# Patient Record
Sex: FEMALE | Race: BLACK OR AFRICAN AMERICAN | Employment: PART TIME | URBAN - METROPOLITAN AREA
[De-identification: names, ages, dates, MRNs, and addresses within clinical notes are randomized per-mention and may not be internally consistent; named-entity substitution may affect disease eponyms.]

---
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
## 2023-07-25 ENCOUNTER — APPOINTMENT (OUTPATIENT)
Dept: CT IMAGING | Age: 23
End: 2023-07-25

## 2023-07-25 ENCOUNTER — HOSPITAL ENCOUNTER (EMERGENCY)
Age: 23
Discharge: HOME | End: 2023-07-25
Attending: EMERGENCY MEDICINE

## 2023-07-25 VITALS
TEMPERATURE: 98.3 F | BODY MASS INDEX: 30.39 KG/M2 | OXYGEN SATURATION: 100 % | DIASTOLIC BLOOD PRESSURE: 77 MMHG | HEIGHT: 64 IN | HEART RATE: 75 BPM | SYSTOLIC BLOOD PRESSURE: 107 MMHG | RESPIRATION RATE: 18 BRPM

## 2023-07-25 DIAGNOSIS — S09.90XA INJURY OF HEAD, INITIAL ENCOUNTER: Primary | ICD-10-CM

## 2023-07-25 PROCEDURE — 72125 CT NECK SPINE W/O DYE: CPT

## 2023-07-25 PROCEDURE — 70450 CT HEAD/BRAIN W/O DYE: CPT

## 2023-07-25 PROCEDURE — 99284 EMERGENCY DEPT VISIT MOD MDM: CPT

## 2023-07-25 RX ORDER — TOPIRAMATE 50 MG/1
50 TABLET, FILM COATED ORAL 2 TIMES DAILY
COMMUNITY
Start: 2023-03-21

## 2023-07-25 RX ORDER — ARIPIPRAZOLE 30 MG/1
30 TABLET ORAL DAILY
COMMUNITY
Start: 2023-03-21

## 2023-07-25 RX ORDER — LORAZEPAM 0.5 MG/1
1 TABLET ORAL 3 TIMES DAILY PRN
COMMUNITY
Start: 2023-03-21

## 2023-07-25 RX ORDER — LEVONORGESTREL 52 MG/1
1 INTRAUTERINE DEVICE INTRAUTERINE ONCE
COMMUNITY

## 2023-07-25 RX ORDER — RISPERIDONE 2 MG/1
2 TABLET ORAL 2 TIMES DAILY
COMMUNITY
Start: 2023-03-21

## 2023-07-25 RX ORDER — BUPROPION HYDROCHLORIDE 300 MG/1
300 TABLET ORAL DAILY
COMMUNITY
Start: 2023-04-18

## 2023-07-25 RX ORDER — DEXTROAMPHETAMINE SULFATE 10 MG/1
10 TABLET ORAL 3 TIMES DAILY
COMMUNITY
Start: 2019-07-15

## 2023-07-25 RX ORDER — ATOMOXETINE 40 MG/1
40 CAPSULE ORAL DAILY
COMMUNITY
Start: 2023-03-21

## 2023-07-25 RX ORDER — PRAZOSIN HYDROCHLORIDE 5 MG/1
5 CAPSULE ORAL NIGHTLY
COMMUNITY
Start: 2022-08-17

## 2023-07-25 RX ORDER — CLONIDINE HYDROCHLORIDE 0.3 MG/1
2 TABLET ORAL NIGHTLY
COMMUNITY
Start: 2023-03-21

## 2023-07-25 ASSESSMENT — PAIN - FUNCTIONAL ASSESSMENT: PAIN_FUNCTIONAL_ASSESSMENT: 0-10

## 2023-07-25 ASSESSMENT — PAIN DESCRIPTION - DESCRIPTORS: DESCRIPTORS: ACHING

## 2023-07-25 ASSESSMENT — PAIN DESCRIPTION - LOCATION: LOCATION: HEAD

## 2023-07-25 ASSESSMENT — PAIN SCALES - GENERAL: PAINLEVEL_OUTOF10: 8

## 2023-07-25 NOTE — ED PROVIDER NOTES
I PERSONALLY SAW THE PATIENT AND PERFORMED A SUBSTANTIVE PORTION OF THE VISIT INCLUDING ALL ASPECTS OF THE MEDICAL DECISION MAKING PROCESS. [address] [facility] [address] [address]      Pt Name: Mayra Gan  MRN: [id number]  [address] [facility] 2000  Date of evaluation: 7/25/2023  Provider: Chandler Sánchez MD    CHIEF COMPLAINT       Chief Complaint   Patient presents with    Headache     Pt states she fell on Saturday afternoon and continues to have a headache. She tripped over her cat and fell down about 5 stairs. Unsure of LOC. States Saturday evening she vomited. C/o neck pain and states she is seeing floaters. HISTORY OF PRESENT ILLNESS    Mayra Gan is a 24 y.o. female who presents to the emergency department with head injury. Patient presents with head injury that happened Saturday. She fell hit her head. Has had head pain and nausea vomiting since. No chest pain or shortness of breath. No other associated symptoms. No weakness, saddle numbness, loss of bowel or bladder function. No other associated symptoms. Nursing Notes were reviewed. Including nursing noted for FM, Surgical History, Past Medical History, Social History, vitals, and allergies; agree with all. REVIEW OF SYSTEMS       Review of Systems    Except as noted above the remainder of the review of systems was reviewed and negative. PAST MEDICAL HISTORY     Past Medical History:   Diagnosis Date    Chronic kidney disease        SURGICAL HISTORY     History reviewed. No pertinent surgical history.     CURRENT MEDICATIONS       Previous Medications    ARIPIPRAZOLE (ABILIFY) 30 MG TABLET    Take 1 tablet by mouth daily    ATOMOXETINE (STRATTERA) 40 MG CAPSULE    Take 1 capsule by mouth daily    BUPROPION (WELLBUTRIN XL) 300 MG EXTENDED RELEASE TABLET    Take 1 tablet by mouth daily    CLONIDINE (CATAPRES) 0.3 MG TABLET    Take 2 tablets by mouth at bedtime    DEXTROAMPHETAMINE

## 2023-07-30 PROCEDURE — 96374 THER/PROPH/DIAG INJ IV PUSH: CPT

## 2023-07-30 PROCEDURE — 99285 EMERGENCY DEPT VISIT HI MDM: CPT

## 2023-07-30 PROCEDURE — 96375 TX/PRO/DX INJ NEW DRUG ADDON: CPT

## 2023-07-31 ENCOUNTER — APPOINTMENT (OUTPATIENT)
Dept: CT IMAGING | Age: 23
DRG: 103 | End: 2023-07-31
Payer: COMMERCIAL

## 2023-07-31 ENCOUNTER — HOSPITAL ENCOUNTER (INPATIENT)
Age: 23
LOS: 1 days | Discharge: HOME | DRG: 103 | End: 2023-08-01
Attending: EMERGENCY MEDICINE | Admitting: INTERNAL MEDICINE
Payer: COMMERCIAL

## 2023-07-31 DIAGNOSIS — Z71.89 GOALS OF CARE, COUNSELING/DISCUSSION: ICD-10-CM

## 2023-07-31 DIAGNOSIS — R51.9 ACUTE INTRACTABLE HEADACHE, UNSPECIFIED HEADACHE TYPE: Primary | ICD-10-CM

## 2023-07-31 LAB
ALBUMIN SERPL-MCNC: 4.3 G/DL (ref 3.4–5)
ALBUMIN/GLOB SERPL: 1.4 {RATIO} (ref 1.1–2.2)
ALP SERPL-CCNC: 150 U/L (ref 40–129)
ALT SERPL-CCNC: 7 U/L (ref 10–40)
ANION GAP SERPL CALCULATED.3IONS-SCNC: 13 MMOL/L (ref 3–16)
ANION GAP SERPL CALCULATED.3IONS-SCNC: 9 MMOL/L (ref 3–16)
AST SERPL-CCNC: 16 U/L (ref 15–37)
BASOPHILS # BLD: 0 K/UL (ref 0–0.2)
BASOPHILS # BLD: 0.1 K/UL (ref 0–0.2)
BASOPHILS NFR BLD: 0.3 %
BASOPHILS NFR BLD: 0.7 %
BILIRUB SERPL-MCNC: <0.2 MG/DL (ref 0–1)
BILIRUB UR QL STRIP.AUTO: NEGATIVE
BUN SERPL-MCNC: 10 MG/DL (ref 7–20)
BUN SERPL-MCNC: 10 MG/DL (ref 7–20)
CALCIUM SERPL-MCNC: 8.5 MG/DL (ref 8.3–10.6)
CALCIUM SERPL-MCNC: 9 MG/DL (ref 8.3–10.6)
CHLORIDE SERPL-SCNC: 108 MMOL/L (ref 99–110)
CHLORIDE SERPL-SCNC: 111 MMOL/L (ref 99–110)
CLARITY UR: CLEAR
CO2 SERPL-SCNC: 19 MMOL/L (ref 21–32)
CO2 SERPL-SCNC: 21 MMOL/L (ref 21–32)
COLOR UR: YELLOW
CREAT SERPL-MCNC: 1.5 MG/DL (ref 0.6–1.1)
CREAT SERPL-MCNC: 1.5 MG/DL (ref 0.6–1.1)
DEPRECATED RDW RBC AUTO: 12.7 % (ref 12.4–15.4)
DEPRECATED RDW RBC AUTO: 12.9 % (ref 12.4–15.4)
EOSINOPHIL # BLD: 0.1 K/UL (ref 0–0.6)
EOSINOPHIL # BLD: 0.1 K/UL (ref 0–0.6)
EOSINOPHIL NFR BLD: 0.5 %
EOSINOPHIL NFR BLD: 0.7 %
GFR SERPLBLD CREATININE-BSD FMLA CKD-EPI: 49 ML/MIN/{1.73_M2}
GFR SERPLBLD CREATININE-BSD FMLA CKD-EPI: 49 ML/MIN/{1.73_M2}
GLUCOSE SERPL-MCNC: 128 MG/DL (ref 70–99)
GLUCOSE SERPL-MCNC: 97 MG/DL (ref 70–99)
GLUCOSE UR STRIP.AUTO-MCNC: NEGATIVE MG/DL
HCG SERPL QL: NEGATIVE
HCT VFR BLD AUTO: 38.1 % (ref 36–48)
HCT VFR BLD AUTO: 41.2 % (ref 36–48)
HGB BLD-MCNC: 12.5 G/DL (ref 12–16)
HGB BLD-MCNC: 13.5 G/DL (ref 12–16)
HGB UR QL STRIP.AUTO: NEGATIVE
INR PPP: 1.13 (ref 0.84–1.16)
KETONES UR STRIP.AUTO-MCNC: NEGATIVE MG/DL
LACTATE BLDV-SCNC: 1 MMOL/L (ref 0.4–1.9)
LEUKOCYTE ESTERASE UR QL STRIP.AUTO: NEGATIVE
LYMPHOCYTES # BLD: 3 K/UL (ref 1–5.1)
LYMPHOCYTES # BLD: 3.3 K/UL (ref 1–5.1)
LYMPHOCYTES NFR BLD: 18.9 %
LYMPHOCYTES NFR BLD: 22.7 %
MAGNESIUM SERPL-MCNC: 1.8 MG/DL (ref 1.8–2.4)
MCH RBC QN AUTO: 28.5 PG (ref 26–34)
MCH RBC QN AUTO: 28.6 PG (ref 26–34)
MCHC RBC AUTO-ENTMCNC: 32.7 G/DL (ref 31–36)
MCHC RBC AUTO-ENTMCNC: 32.7 G/DL (ref 31–36)
MCV RBC AUTO: 87.2 FL (ref 80–100)
MCV RBC AUTO: 87.4 FL (ref 80–100)
MONOCYTES # BLD: 0.6 K/UL (ref 0–1.3)
MONOCYTES # BLD: 0.9 K/UL (ref 0–1.3)
MONOCYTES NFR BLD: 4.4 %
MONOCYTES NFR BLD: 5.6 %
NEUTROPHILS # BLD: 10.4 K/UL (ref 1.7–7.7)
NEUTROPHILS # BLD: 11.6 K/UL (ref 1.7–7.7)
NEUTROPHILS NFR BLD: 72.1 %
NEUTROPHILS NFR BLD: 74.1 %
NITRITE UR QL STRIP.AUTO: NEGATIVE
PH UR STRIP.AUTO: 6 [PH] (ref 5–8)
PLATELET # BLD AUTO: 321 K/UL (ref 135–450)
PLATELET # BLD AUTO: 334 K/UL (ref 135–450)
PMV BLD AUTO: 7.4 FL (ref 5–10.5)
PMV BLD AUTO: 7.8 FL (ref 5–10.5)
POTASSIUM SERPL-SCNC: 3.3 MMOL/L (ref 3.5–5.1)
POTASSIUM SERPL-SCNC: 3.6 MMOL/L (ref 3.5–5.1)
PROT SERPL-MCNC: 7.4 G/DL (ref 6.4–8.2)
PROT UR STRIP.AUTO-MCNC: NEGATIVE MG/DL
PROTHROMBIN TIME: 14.6 SEC (ref 11.5–14.8)
RBC # BLD AUTO: 4.35 M/UL (ref 4–5.2)
RBC # BLD AUTO: 4.72 M/UL (ref 4–5.2)
SODIUM SERPL-SCNC: 140 MMOL/L (ref 136–145)
SODIUM SERPL-SCNC: 141 MMOL/L (ref 136–145)
SP GR UR STRIP.AUTO: 1.02 (ref 1–1.03)
UA COMPLETE W REFLEX CULTURE PNL UR: NORMAL
UA DIPSTICK W REFLEX MICRO PNL UR: NORMAL
URN SPEC COLLECT METH UR: NORMAL
UROBILINOGEN UR STRIP-ACNC: 1 E.U./DL
WBC # BLD AUTO: 14.3 K/UL (ref 4–11)
WBC # BLD AUTO: 15.6 K/UL (ref 4–11)

## 2023-07-31 PROCEDURE — 83735 ASSAY OF MAGNESIUM: CPT

## 2023-07-31 PROCEDURE — 2500000003 HC RX 250 WO HCPCS: Performed by: STUDENT IN AN ORGANIZED HEALTH CARE EDUCATION/TRAINING PROGRAM

## 2023-07-31 PROCEDURE — 6370000000 HC RX 637 (ALT 250 FOR IP): Performed by: HOSPITALIST

## 2023-07-31 PROCEDURE — 6370000000 HC RX 637 (ALT 250 FOR IP): Performed by: INTERNAL MEDICINE

## 2023-07-31 PROCEDURE — 83605 ASSAY OF LACTIC ACID: CPT

## 2023-07-31 PROCEDURE — 2580000003 HC RX 258: Performed by: STUDENT IN AN ORGANIZED HEALTH CARE EDUCATION/TRAINING PROGRAM

## 2023-07-31 PROCEDURE — 6360000002 HC RX W HCPCS

## 2023-07-31 PROCEDURE — 94760 N-INVAS EAR/PLS OXIMETRY 1: CPT

## 2023-07-31 PROCEDURE — 1200000000 HC SEMI PRIVATE

## 2023-07-31 PROCEDURE — 85025 COMPLETE CBC W/AUTO DIFF WBC: CPT

## 2023-07-31 PROCEDURE — 6370000000 HC RX 637 (ALT 250 FOR IP): Performed by: NURSE PRACTITIONER

## 2023-07-31 PROCEDURE — 2580000003 HC RX 258: Performed by: INTERNAL MEDICINE

## 2023-07-31 PROCEDURE — 2580000003 HC RX 258

## 2023-07-31 PROCEDURE — 87040 BLOOD CULTURE FOR BACTERIA: CPT

## 2023-07-31 PROCEDURE — 36415 COLL VENOUS BLD VENIPUNCTURE: CPT

## 2023-07-31 PROCEDURE — 70450 CT HEAD/BRAIN W/O DYE: CPT

## 2023-07-31 PROCEDURE — 80053 COMPREHEN METABOLIC PANEL: CPT

## 2023-07-31 PROCEDURE — 84703 CHORIONIC GONADOTROPIN ASSAY: CPT

## 2023-07-31 PROCEDURE — 85610 PROTHROMBIN TIME: CPT

## 2023-07-31 PROCEDURE — 81003 URINALYSIS AUTO W/O SCOPE: CPT

## 2023-07-31 RX ORDER — DEXTROAMPHETAMINE SULFATE 10 MG/1
10 TABLET ORAL 3 TIMES DAILY
Status: DISCONTINUED | OUTPATIENT
Start: 2023-07-31 | End: 2023-08-01 | Stop reason: HOSPADM

## 2023-07-31 RX ORDER — SODIUM CHLORIDE, SODIUM LACTATE, POTASSIUM CHLORIDE, AND CALCIUM CHLORIDE .6; .31; .03; .02 G/100ML; G/100ML; G/100ML; G/100ML
1000 INJECTION, SOLUTION INTRAVENOUS ONCE
Status: COMPLETED | OUTPATIENT
Start: 2023-07-31 | End: 2023-07-31

## 2023-07-31 RX ORDER — KETOROLAC TROMETHAMINE 15 MG/ML
15 INJECTION, SOLUTION INTRAMUSCULAR; INTRAVENOUS ONCE
Status: DISCONTINUED | OUTPATIENT
Start: 2023-07-31 | End: 2023-07-31

## 2023-07-31 RX ORDER — KETOROLAC TROMETHAMINE 30 MG/ML
15 INJECTION, SOLUTION INTRAMUSCULAR; INTRAVENOUS ONCE
Status: COMPLETED | OUTPATIENT
Start: 2023-07-31 | End: 2023-07-31

## 2023-07-31 RX ORDER — BUTALBITAL, ACETAMINOPHEN AND CAFFEINE 50; 325; 40 MG/1; MG/1; MG/1
1 TABLET ORAL EVERY 4 HOURS PRN
Status: DISCONTINUED | OUTPATIENT
Start: 2023-07-31 | End: 2023-08-01 | Stop reason: HOSPADM

## 2023-07-31 RX ORDER — CYCLOBENZAPRINE HCL 10 MG
10 TABLET ORAL 3 TIMES DAILY PRN
Status: DISCONTINUED | OUTPATIENT
Start: 2023-07-31 | End: 2023-08-01 | Stop reason: HOSPADM

## 2023-07-31 RX ORDER — POLYETHYLENE GLYCOL 3350 17 G/17G
17 POWDER, FOR SOLUTION ORAL DAILY PRN
Status: DISCONTINUED | OUTPATIENT
Start: 2023-07-31 | End: 2023-08-01 | Stop reason: HOSPADM

## 2023-07-31 RX ORDER — MORPHINE SULFATE 2 MG/ML
2 INJECTION, SOLUTION INTRAMUSCULAR; INTRAVENOUS ONCE
Status: COMPLETED | OUTPATIENT
Start: 2023-07-31 | End: 2023-07-31

## 2023-07-31 RX ORDER — RISPERIDONE 1 MG/1
2 TABLET ORAL 2 TIMES DAILY
Status: DISCONTINUED | OUTPATIENT
Start: 2023-07-31 | End: 2023-08-01 | Stop reason: HOSPADM

## 2023-07-31 RX ORDER — LORAZEPAM 0.5 MG/1
0.5 TABLET ORAL 3 TIMES DAILY PRN
Status: DISCONTINUED | OUTPATIENT
Start: 2023-07-31 | End: 2023-08-01 | Stop reason: HOSPADM

## 2023-07-31 RX ORDER — SODIUM CHLORIDE 0.9 % (FLUSH) 0.9 %
5-40 SYRINGE (ML) INJECTION PRN
Status: DISCONTINUED | OUTPATIENT
Start: 2023-07-31 | End: 2023-08-01 | Stop reason: HOSPADM

## 2023-07-31 RX ORDER — ONDANSETRON 4 MG/1
4 TABLET, ORALLY DISINTEGRATING ORAL EVERY 8 HOURS PRN
Status: DISCONTINUED | OUTPATIENT
Start: 2023-07-31 | End: 2023-08-01 | Stop reason: HOSPADM

## 2023-07-31 RX ORDER — ACETAMINOPHEN 650 MG/1
650 SUPPOSITORY RECTAL EVERY 6 HOURS PRN
Status: DISCONTINUED | OUTPATIENT
Start: 2023-07-31 | End: 2023-08-01 | Stop reason: HOSPADM

## 2023-07-31 RX ORDER — SODIUM CHLORIDE 0.9 % (FLUSH) 0.9 %
5-40 SYRINGE (ML) INJECTION EVERY 12 HOURS SCHEDULED
Status: DISCONTINUED | OUTPATIENT
Start: 2023-07-31 | End: 2023-08-01 | Stop reason: HOSPADM

## 2023-07-31 RX ORDER — ATOMOXETINE 40 MG/1
40 CAPSULE ORAL DAILY
Status: DISCONTINUED | OUTPATIENT
Start: 2023-07-31 | End: 2023-08-01 | Stop reason: HOSPADM

## 2023-07-31 RX ORDER — SODIUM CHLORIDE 9 MG/ML
INJECTION, SOLUTION INTRAVENOUS CONTINUOUS
Status: ACTIVE | OUTPATIENT
Start: 2023-07-31 | End: 2023-07-31

## 2023-07-31 RX ORDER — BUPROPION HYDROCHLORIDE 150 MG/1
300 TABLET ORAL DAILY
Status: DISCONTINUED | OUTPATIENT
Start: 2023-07-31 | End: 2023-08-01 | Stop reason: HOSPADM

## 2023-07-31 RX ORDER — SODIUM CHLORIDE 9 MG/ML
INJECTION, SOLUTION INTRAVENOUS PRN
Status: DISCONTINUED | OUTPATIENT
Start: 2023-07-31 | End: 2023-08-01 | Stop reason: HOSPADM

## 2023-07-31 RX ORDER — CLONIDINE HYDROCHLORIDE 0.1 MG/1
0.6 TABLET ORAL NIGHTLY
Status: DISCONTINUED | OUTPATIENT
Start: 2023-07-31 | End: 2023-08-01 | Stop reason: HOSPADM

## 2023-07-31 RX ORDER — ACETAMINOPHEN 325 MG/1
650 TABLET ORAL EVERY 6 HOURS PRN
Status: DISCONTINUED | OUTPATIENT
Start: 2023-07-31 | End: 2023-08-01 | Stop reason: HOSPADM

## 2023-07-31 RX ORDER — DIPHENHYDRAMINE HYDROCHLORIDE 50 MG/ML
12.5 INJECTION INTRAMUSCULAR; INTRAVENOUS ONCE
Status: COMPLETED | OUTPATIENT
Start: 2023-07-31 | End: 2023-07-31

## 2023-07-31 RX ORDER — ARIPIPRAZOLE 15 MG/1
30 TABLET ORAL DAILY
Status: DISCONTINUED | OUTPATIENT
Start: 2023-07-31 | End: 2023-08-01 | Stop reason: HOSPADM

## 2023-07-31 RX ORDER — PRAZOSIN HYDROCHLORIDE 5 MG/1
5 CAPSULE ORAL NIGHTLY
Status: DISCONTINUED | OUTPATIENT
Start: 2023-07-31 | End: 2023-08-01 | Stop reason: HOSPADM

## 2023-07-31 RX ORDER — ONDANSETRON 2 MG/ML
4 INJECTION INTRAMUSCULAR; INTRAVENOUS EVERY 6 HOURS PRN
Status: DISCONTINUED | OUTPATIENT
Start: 2023-07-31 | End: 2023-08-01 | Stop reason: HOSPADM

## 2023-07-31 RX ORDER — TOPIRAMATE 25 MG/1
50 TABLET ORAL 2 TIMES DAILY
Status: DISCONTINUED | OUTPATIENT
Start: 2023-07-31 | End: 2023-08-01 | Stop reason: HOSPADM

## 2023-07-31 RX ORDER — PROCHLORPERAZINE EDISYLATE 5 MG/ML
10 INJECTION INTRAMUSCULAR; INTRAVENOUS ONCE
Status: COMPLETED | OUTPATIENT
Start: 2023-07-31 | End: 2023-07-31

## 2023-07-31 RX ADMIN — VALPROATE SODIUM 500 MG: 100 INJECTION, SOLUTION INTRAVENOUS at 18:39

## 2023-07-31 RX ADMIN — BUTALBITAL, ACETAMINOPHEN AND CAFFEINE 1 TABLET: 325; 50; 40 TABLET ORAL at 20:31

## 2023-07-31 RX ADMIN — DIPHENHYDRAMINE HYDROCHLORIDE 12.5 MG: 50 INJECTION, SOLUTION INTRAMUSCULAR; INTRAVENOUS at 01:30

## 2023-07-31 RX ADMIN — SODIUM CHLORIDE: 9 INJECTION, SOLUTION INTRAVENOUS at 05:31

## 2023-07-31 RX ADMIN — BUTALBITAL, ACETAMINOPHEN AND CAFFEINE 1 TABLET: 325; 50; 40 TABLET ORAL at 15:14

## 2023-07-31 RX ADMIN — SODIUM CHLORIDE, POTASSIUM CHLORIDE, SODIUM LACTATE AND CALCIUM CHLORIDE 1000 ML: 600; 310; 30; 20 INJECTION, SOLUTION INTRAVENOUS at 01:38

## 2023-07-31 RX ADMIN — BUPROPION HYDROCHLORIDE 300 MG: 150 TABLET, EXTENDED RELEASE ORAL at 08:43

## 2023-07-31 RX ADMIN — ARIPIPRAZOLE 30 MG: 15 TABLET ORAL at 09:26

## 2023-07-31 RX ADMIN — LORAZEPAM 0.5 MG: 0.5 TABLET ORAL at 05:40

## 2023-07-31 RX ADMIN — MORPHINE SULFATE 2 MG: 2 INJECTION, SOLUTION INTRAMUSCULAR; INTRAVENOUS at 03:02

## 2023-07-31 RX ADMIN — SODIUM CHLORIDE, PRESERVATIVE FREE 10 ML: 5 INJECTION INTRAVENOUS at 23:39

## 2023-07-31 RX ADMIN — RISPERIDONE 2 MG: 1 TABLET ORAL at 08:43

## 2023-07-31 RX ADMIN — TOPIRAMATE 50 MG: 25 TABLET, FILM COATED ORAL at 08:43

## 2023-07-31 RX ADMIN — KETOROLAC TROMETHAMINE 15 MG: 60 INJECTION, SOLUTION INTRAMUSCULAR at 02:29

## 2023-07-31 RX ADMIN — CYCLOBENZAPRINE 10 MG: 10 TABLET, FILM COATED ORAL at 13:15

## 2023-07-31 RX ADMIN — ATOMOXETINE 40 MG: 40 CAPSULE ORAL at 09:26

## 2023-07-31 RX ADMIN — PROCHLORPERAZINE EDISYLATE 10 MG: 5 INJECTION INTRAMUSCULAR; INTRAVENOUS at 01:35

## 2023-07-31 ASSESSMENT — PAIN SCALES - GENERAL
PAINLEVEL_OUTOF10: 7
PAINLEVEL_OUTOF10: 8
PAINLEVEL_OUTOF10: 7
PAINLEVEL_OUTOF10: 7
PAINLEVEL_OUTOF10: 6

## 2023-07-31 ASSESSMENT — PAIN DESCRIPTION - FREQUENCY: FREQUENCY: CONTINUOUS

## 2023-07-31 ASSESSMENT — ENCOUNTER SYMPTOMS
CONSTIPATION: 0
VOMITING: 0
NAUSEA: 0
SORE THROAT: 0
RHINORRHEA: 0
COUGH: 0
DIARRHEA: 0
SHORTNESS OF BREATH: 0
EYE PAIN: 0
BACK PAIN: 0
PHOTOPHOBIA: 1
ABDOMINAL PAIN: 0

## 2023-07-31 ASSESSMENT — PAIN DESCRIPTION - ORIENTATION: ORIENTATION: RIGHT;LEFT

## 2023-07-31 ASSESSMENT — PAIN DESCRIPTION - DESCRIPTORS: DESCRIPTORS: THROBBING

## 2023-07-31 ASSESSMENT — PAIN DESCRIPTION - LOCATION
LOCATION: HEAD
LOCATION: HEAD

## 2023-07-31 ASSESSMENT — PAIN DESCRIPTION - PAIN TYPE: TYPE: ACUTE PAIN

## 2023-07-31 ASSESSMENT — PAIN DESCRIPTION - ONSET: ONSET: ON-GOING

## 2023-07-31 ASSESSMENT — PAIN - FUNCTIONAL ASSESSMENT: PAIN_FUNCTIONAL_ASSESSMENT: PREVENTS OR INTERFERES SOME ACTIVE ACTIVITIES AND ADLS

## 2023-07-31 NOTE — ED TRIAGE NOTES
Diane Milligan is a 24 y.o. female brought herself to the ER for eval of headache x 2 weeks that is a 8/10. The patient states that her headache is making it hard to concentrate and that she gets confused. The patient feels that she has had a fever the last two days because she had chills but never took her temp. The patient states that her leg went numb while sitting at work today at 1700 for 30 mins but is now normal. The patient is alert and oriented with an open and patent airway.

## 2023-07-31 NOTE — PLAN OF CARE
Problem: Discharge Planning  Goal: Discharge to home or other facility with appropriate resources  7/31/2023 1054 by Laureen Ravi RN  Outcome: Progressing  7/31/2023 0626 by Jannette Guzman RN  Outcome: Progressing     Problem: Safety - Adult  Goal: Free from fall injury  7/31/2023 1054 by Laureen Ravi RN  Outcome: Progressing  7/31/2023 0626 by Jannette Guzman RN  Outcome: Progressing     Problem: ABCDS Injury Assessment  Goal: Absence of physical injury  7/31/2023 1054 by Laureen Ravi RN  Outcome: Progressing  7/31/2023 0626 by Jannette Guzman RN  Outcome: Progressing     Problem: Neurosensory - Adult  Goal: Achieves stable or improved neurological status  7/31/2023 1054 by Laureen Ravi RN  Outcome: Progressing  7/31/2023 0626 by Jannette Guzman RN  Outcome: Progressing  Goal: Achieves maximal functionality and self care  7/31/2023 1054 by Laureen Ravi RN  Outcome: Progressing  7/31/2023 0626 by Jannette Guzman RN  Outcome: Progressing     Problem: Musculoskeletal - Adult  Goal: Return mobility to safest level of function  7/31/2023 1054 by Laureen Ravi RN  Outcome: Progressing  7/31/2023 0626 by Jannette Guzman RN  Outcome: Progressing  Goal: Maintain proper alignment of affected body part  7/31/2023 1054 by Laureen Ravi RN  Outcome: Progressing  7/31/2023 0626 by Jannette Guzman RN  Outcome: Progressing  Goal: Return ADL status to a safe level of function  7/31/2023 1054 by Laureen Ravi RN  Outcome: Progressing  7/31/2023 0626 by Jannette Guzman RN  Outcome: Progressing

## 2023-07-31 NOTE — PROGRESS NOTES
Two attempts to call patient's mother at patient's request.  Got voice mail. Left message with temporary number to call back. No call back received.     [id number]  [facility] - [place] Shaylee, AGACNP-BC  [address] [facility]

## 2023-07-31 NOTE — H&P
Hospital Medicine  History and Physical    Patient:  Zoraida Garcia  MRN: [id number]    CHIEF COMPLAINT:    Patient presents with    Fever       X 2 days, patient felt like she had a fever with chills, but didn't take her temp    Headache       X 2 weeks, 9/10 pt states its making it hard to concentrate and she feels confused     PCP: No primary care provider on file. HISTORY OF PRESENT ILLNESS:   The patient is a 24 y.o. female who presents to the emergency department with fever and headache. Patient is a 2-day history of fever and headache. 10 out of 10 sharp achy headache. Difficulty with concentration and confusion. Positive for neck pain. No other associated symptoms. Denied weakness, chest pain, sob, chills, dizziness, vision changes, abdominal pain, diarrhea, or dysuria. PAST MEDICAL HISTORY:      Diagnosis Date    Chronic kidney disease        PAST SURGICAL HISTORY:  History reviewed. No pertinent surgical history. FAMILY  HISTORY:  History reviewed. No pertinent family history. SOCIAL HISTORY:  TOBACCO:   reports that she has never smoked. She has never used smokeless tobacco.  ETOH:   reports no history of alcohol use. MEDICATIONS PRIOR TO ADMISSION:  Prior to Admission medications    Medication Sig Start Date End Date Taking? Authorizing Provider   topiramate (TOPAMAX) 50 MG tablet Take 1 tablet by mouth 2 times daily 3/21/23   Historical Provider, MD   risperiDONE (RISPERDAL) 2 MG tablet Take 1 tablet by mouth 2 times daily 3/21/23   Historical Provider, MD   prazosin (MINIPRESS) 5 MG capsule Take 1 capsule by mouth nightly 8/17/22   Historical Provider, MD   LORazepam (ATIVAN) 0.5 MG tablet Take 1 tablet by mouth 3 times daily as needed for Anxiety.  Max Daily Amount: 1.5 mg 3/21/23   Historical Provider, MD   levonorgestrel (MIRENA, 52 MG,) IUD 52 mg 1 Intra Uterine Device by IntraUTERine route once    Historical Provider, MD   dextroamphetamine (DEXTROSTAT) 10 MG tablet Take 1

## 2023-07-31 NOTE — PROGRESS NOTES
Pharmacy Medication Reconciliation Note     List of medications patient is currently taking is complete. Source of information:   1. Conversation with pt at bedside   2. MD office note     Allergies   Allergen Reactions    Latex Rash    Food      Milk    Oseltamivir        Notes regarding home medications:   1. Dextroamphetamine 10 mg is taken BID  2. Risperdal regimen is 2 mg AM, 1 mg midday ( if needed), 2 mg HS  3.   Lorazepam is taken 1 mg at hs      Brenda Andi[address]   7/31/2023  10:12 AM

## 2023-07-31 NOTE — PLAN OF CARE
Problem: Discharge Planning  Goal: Discharge to home or other facility with appropriate resources  Outcome: Progressing     Problem: Safety - Adult  Goal: Free from fall injury  Outcome: Progressing     Problem: ABCDS Injury Assessment  Goal: Absence of physical injury  Outcome: Progressing     Problem: Neurosensory - Adult  Goal: Achieves stable or improved neurological status  Outcome: Progressing  Goal: Achieves maximal functionality and self care  Outcome: Progressing     Problem: Musculoskeletal - Adult  Goal: Return mobility to safest level of function  Outcome: Progressing  Goal: Maintain proper alignment of affected body part  Outcome: Progressing  Goal: Return ADL status to a safe level of function  Outcome: Progressing

## 2023-07-31 NOTE — PROGRESS NOTES
4 Eyes Skin Assessment     NAME:  Jessenia Sharpe  YOB: 2000  MEDICAL RECORD NUMBER:  [id number]    The patient is being assessed for  Admission    I agree that at least one RN has performed a thorough Head to Toe Skin Assessment on the patient. ALL assessment sites listed below have been assessed. Areas assessed by both nurses:    Head, Face, Ears, Shoulders, Back, Chest, Arms, Elbows, Hands, Sacrum. Buttock, Coccyx, Ischium, Legs. Feet and Heels, and Under Medical Devices         Does the Patient have a Wound?  No noted wound(s)       [place] Prevention initiated by RN: No  Wound Care Orders initiated by RN: No    Pressure Injury (Stage 3,4, Unstageable, DTI, NWPT, and Complex wounds) if present, place Wound referral order by RN under : No    New Ostomies, if present place, Ostomy referral order under : No     Nurse 1 eSignature: Electronically signed by Mart Mackey RN on 7/31/23 at 6:27 AM EDT    **SHARE this note so that the co-signing nurse can place an eSignature**    Nurse 2 eSignature: Electronically signed by River Horowitz RN on 7/31/23 at 6:28 AM EDT

## 2023-07-31 NOTE — ED PROVIDER NOTES
I PERSONALLY SAW THE PATIENT AND PERFORMED A SUBSTANTIVE PORTION OF THE VISIT INCLUDING ALL ASPECTS OF THE MEDICAL DECISION MAKING PROCESS. [facility]      Pt Name: Elham Franco  MRN: [id number]  [address] [facility] 2000  Date of evaluation: 7/30/2023  Provider: Ruben Vela MD    [address]       Chief Complaint   Patient presents with    Fever     X 2 days, patient felt like she had a fever with chills, but didn't take her temp    Headache     X 2 weeks, 9/10 pt states its making it hard to concentrate and she feels confused       HISTORY OF PRESENT ILLNESS    Elham Franco is a 24 y.o. female who presents to the emergency department with fever and headache. Patient is a 2-day history of fever and headache. 10 out of 10 sharp achy headache. Difficulty with concentration and confusion. Positive for neck pain. No other associated symptoms. No abdominal pain. Nursing Notes were reviewed. Including nursing noted for FM, Surgical History, Past Medical History, Social History, vitals, and allergies; agree with all. REVIEW OF SYSTEMS       Review of Systems    Except as noted above the remainder of the review of systems was reviewed and negative. PAST MEDICAL HISTORY     Past Medical History:   Diagnosis Date    Chronic kidney disease        SURGICAL HISTORY     History reviewed. No pertinent surgical history. CURRENT MEDICATIONS       Current Discharge Medication List        CONTINUE these medications which have NOT CHANGED    Details   topiramate (TOPAMAX) 50 MG tablet Take 1 tablet by mouth 2 times daily      risperiDONE (RISPERDAL) 2 MG tablet Take 1 tablet by mouth 2 times daily      prazosin (MINIPRESS) 5 MG capsule Take 1 capsule by mouth nightly      LORazepam (ATIVAN) 0.5 MG tablet Take 1 tablet by mouth 3 times daily as needed for Anxiety.  Max Daily Amount: 1.5 mg      levonorgestrel (MIRENA, 52 MG,) IUD 52 mg 1 Intra Uterine Device by
considered but not done, Admit vs D/C, Shared Decision Making, Pt Expectation of Test or Tx.): above       I am the Primary Clinician of Record. FINAL IMPRESSION      1. Acute intractable headache, unspecified headache type    2. Goals of care, counseling/discussion          DISPOSITION/PLAN     DISPOSITION Admitted 07/31/2023 03:49:51 AM      PATIENT REFERRED TO:  No follow-up provider specified.     DISCHARGE MEDICATIONS:  Current Discharge Medication List          DISCONTINUED MEDICATIONS:  Current Discharge Medication List        STOP taking these medications       prazosin (MINIPRESS) 5 MG capsule Comments:   Reason for Stopping:         methylPREDNISolone (MEDROL, JACKY,) 4 MG tablet Comments:   Reason for Stopping:                      (Please note that portions of this note were completed with a voice recognition program.  Efforts were made to edit the dictations but occasionally words are mis-transcribed.)    TITUS Joel CNP (electronically signed)       TITUS Joel CNP  07/31/23 [id number]

## 2023-07-31 NOTE — PROGRESS NOTES
Pt has slight delay in response and slight expressive aphasia. Neuro check showed left side weakness compared to right. No facial droop. Pt has nuchal ridgity. Temperature WDL @ 97.8. Pt scored 3 on NIHSS showing some drift on L side. Shelby Moura MD. No new orders.

## 2023-07-31 NOTE — PROGRESS NOTES
Pt has received both PRNs, flexeril and fiorcet without any relief today.     Electronically signed by Brenda Gonzalez RN on 7/31/2023 at 4:18 PM

## 2023-08-01 ENCOUNTER — APPOINTMENT (OUTPATIENT)
Dept: MRI IMAGING | Age: 23
DRG: 103 | End: 2023-08-01
Payer: COMMERCIAL

## 2023-08-01 VITALS
BODY MASS INDEX: 36.66 KG/M2 | HEART RATE: 94 BPM | TEMPERATURE: 98.6 F | WEIGHT: 214.73 LBS | OXYGEN SATURATION: 98 % | DIASTOLIC BLOOD PRESSURE: 75 MMHG | HEIGHT: 64 IN | SYSTOLIC BLOOD PRESSURE: 115 MMHG | RESPIRATION RATE: 18 BRPM

## 2023-08-01 LAB
ANION GAP SERPL CALCULATED.3IONS-SCNC: 10 MMOL/L (ref 3–16)
BUN SERPL-MCNC: 7 MG/DL (ref 7–20)
CALCIUM SERPL-MCNC: 8.5 MG/DL (ref 8.3–10.6)
CHLORIDE SERPL-SCNC: 115 MMOL/L (ref 99–110)
CO2 SERPL-SCNC: 18 MMOL/L (ref 21–32)
CREAT SERPL-MCNC: 1.3 MG/DL (ref 0.6–1.1)
DEPRECATED RDW RBC AUTO: 13 % (ref 12.4–15.4)
GFR SERPLBLD CREATININE-BSD FMLA CKD-EPI: 59 ML/MIN/{1.73_M2}
GLUCOSE SERPL-MCNC: 86 MG/DL (ref 70–99)
HCT VFR BLD AUTO: 40.4 % (ref 36–48)
HGB BLD-MCNC: 13.2 G/DL (ref 12–16)
MCH RBC QN AUTO: 28.7 PG (ref 26–34)
MCHC RBC AUTO-ENTMCNC: 32.7 G/DL (ref 31–36)
MCV RBC AUTO: 87.6 FL (ref 80–100)
PLATELET # BLD AUTO: 326 K/UL (ref 135–450)
PMV BLD AUTO: 7.4 FL (ref 5–10.5)
POTASSIUM SERPL-SCNC: 4.1 MMOL/L (ref 3.5–5.1)
PROCALCITONIN SERPL IA-MCNC: 0.07 NG/ML (ref 0–0.15)
RBC # BLD AUTO: 4.61 M/UL (ref 4–5.2)
SODIUM SERPL-SCNC: 143 MMOL/L (ref 136–145)
WBC # BLD AUTO: 10.9 K/UL (ref 4–11)

## 2023-08-01 PROCEDURE — 36415 COLL VENOUS BLD VENIPUNCTURE: CPT

## 2023-08-01 PROCEDURE — 6370000000 HC RX 637 (ALT 250 FOR IP): Performed by: INTERNAL MEDICINE

## 2023-08-01 PROCEDURE — 85027 COMPLETE CBC AUTOMATED: CPT

## 2023-08-01 PROCEDURE — 6370000000 HC RX 637 (ALT 250 FOR IP): Performed by: NURSE PRACTITIONER

## 2023-08-01 PROCEDURE — 72156 MRI NECK SPINE W/O & W/DYE: CPT

## 2023-08-01 PROCEDURE — 2580000003 HC RX 258: Performed by: STUDENT IN AN ORGANIZED HEALTH CARE EDUCATION/TRAINING PROGRAM

## 2023-08-01 PROCEDURE — 2500000003 HC RX 250 WO HCPCS: Performed by: STUDENT IN AN ORGANIZED HEALTH CARE EDUCATION/TRAINING PROGRAM

## 2023-08-01 PROCEDURE — 80048 BASIC METABOLIC PNL TOTAL CA: CPT

## 2023-08-01 PROCEDURE — 94760 N-INVAS EAR/PLS OXIMETRY 1: CPT

## 2023-08-01 PROCEDURE — A9577 INJ MULTIHANCE: HCPCS | Performed by: STUDENT IN AN ORGANIZED HEALTH CARE EDUCATION/TRAINING PROGRAM

## 2023-08-01 PROCEDURE — 6360000004 HC RX CONTRAST MEDICATION: Performed by: STUDENT IN AN ORGANIZED HEALTH CARE EDUCATION/TRAINING PROGRAM

## 2023-08-01 PROCEDURE — 84145 PROCALCITONIN (PCT): CPT

## 2023-08-01 PROCEDURE — 70553 MRI BRAIN STEM W/O & W/DYE: CPT

## 2023-08-01 RX ORDER — MORPHINE SULFATE 2 MG/ML
2 INJECTION, SOLUTION INTRAMUSCULAR; INTRAVENOUS ONCE
Status: DISCONTINUED | OUTPATIENT
Start: 2023-08-01 | End: 2023-08-01 | Stop reason: HOSPADM

## 2023-08-01 RX ORDER — BUTALBITAL, ACETAMINOPHEN AND CAFFEINE 50; 325; 40 MG/1; MG/1; MG/1
1 TABLET ORAL EVERY 4 HOURS PRN
Qty: 30 TABLET | Refills: 0 | Status: SHIPPED | OUTPATIENT
Start: 2023-08-01

## 2023-08-01 RX ADMIN — BUPROPION HYDROCHLORIDE 300 MG: 150 TABLET, EXTENDED RELEASE ORAL at 08:43

## 2023-08-01 RX ADMIN — CLONIDINE HYDROCHLORIDE 0.6 MG: 0.1 TABLET ORAL at 00:01

## 2023-08-01 RX ADMIN — TOPIRAMATE 50 MG: 25 TABLET, FILM COATED ORAL at 00:01

## 2023-08-01 RX ADMIN — ATOMOXETINE 40 MG: 40 CAPSULE ORAL at 08:43

## 2023-08-01 RX ADMIN — GADOBENATE DIMEGLUMINE 19 ML: 529 INJECTION, SOLUTION INTRAVENOUS at 07:59

## 2023-08-01 RX ADMIN — RISPERIDONE 2 MG: 1 TABLET ORAL at 00:01

## 2023-08-01 RX ADMIN — VALPROATE SODIUM 500 MG: 100 INJECTION, SOLUTION INTRAVENOUS at 02:08

## 2023-08-01 RX ADMIN — CYCLOBENZAPRINE 10 MG: 10 TABLET, FILM COATED ORAL at 00:02

## 2023-08-01 RX ADMIN — TOPIRAMATE 50 MG: 25 TABLET, FILM COATED ORAL at 08:43

## 2023-08-01 RX ADMIN — LORAZEPAM 0.5 MG: 0.5 TABLET ORAL at 00:02

## 2023-08-01 RX ADMIN — ARIPIPRAZOLE 30 MG: 15 TABLET ORAL at 08:42

## 2023-08-01 RX ADMIN — RISPERIDONE 2 MG: 1 TABLET ORAL at 08:43

## 2023-08-01 RX ADMIN — VALPROATE SODIUM 500 MG: 100 INJECTION, SOLUTION INTRAVENOUS at 08:57

## 2023-08-01 RX ADMIN — PRAZOSIN HYDROCHLORIDE 5 MG: 5 CAPSULE ORAL at 00:02

## 2023-08-01 NOTE — PLAN OF CARE
Problem: Discharge Planning  Goal: Discharge to home or other facility with appropriate resources  8/1/2023 0945 by Wil Romeo RN  Outcome: Progressing  7/31/2023 2347 by Simeon Lehman RN  Outcome: Progressing     Problem: Safety - Adult  Goal: Free from fall injury  8/1/2023 0945 by Wil Romeo RN  Outcome: Progressing  7/31/2023 2347 by Simeon Lehman RN  Outcome: Progressing     Problem: ABCDS Injury Assessment  Goal: Absence of physical injury  8/1/2023 0945 by Wil Romeo RN  Outcome: Progressing  7/31/2023 2347 by Simeon Lehman RN  Outcome: Progressing     Problem: Neurosensory - Adult  Goal: Achieves stable or improved neurological status  8/1/2023 0945 by Wil Romeo RN  Outcome: Progressing  7/31/2023 2347 by Simeon Lehman RN  Outcome: Progressing  Goal: Achieves maximal functionality and self care  8/1/2023 0945 by Wil Romeo RN  Outcome: Progressing  7/31/2023 2347 by Simeon Lehman RN  Outcome: Progressing     Problem: Musculoskeletal - Adult  Goal: Return mobility to safest level of function  8/1/2023 0945 by Wil Romeo RN  Outcome: Progressing  7/31/2023 2347 by Simeon Lehman RN  Outcome: Progressing  Goal: Maintain proper alignment of affected body part  8/1/2023 0945 by Wil Romeo RN  Outcome: Progressing  7/31/2023 2347 by Simeon Lehman RN  Outcome: Progressing  Goal: Return ADL status to a safe level of function  8/1/2023 0945 by Wil Romeo RN  Outcome: Progressing  7/31/2023 2347 by Simeon Lehman RN  Outcome: Progressing     Problem: Pain  Goal: Verbalizes/displays adequate comfort level or baseline comfort level  8/1/2023 0945 by Wil Romeo RN  Outcome: Progressing  7/31/2023 2347 by Simeon Lehman RN  Outcome: Progressing

## 2023-08-01 NOTE — PROGRESS NOTES
Physical Therapy  No eval/discharge  Griffin Perez  [id number]    PT orders received for this 18yo female admitted with HA following a fall. Per RN, the pt has been up on her own in the room. Spoke with the pt directly and she reports she has been up on her own and has had no weakness or balance issues. She reports no concerns re: going home and denies skilled PT needs. The pt hopes to go home today. Will sign off.      Electronically signed by Saundra Borrero, PT [id number] on 8/1/2023 at 1:04 PM

## 2023-08-01 NOTE — PROGRESS NOTES
NEUROLOGY PROGRESS NOTE  Reason for Consult: Dr Cassie Mae MD asked me to see Juana Haq in consultation for evaluation of headache. Chief complaint: \"I've had a headache since the fall. \"    UPDATE 8/1/23:  Patient up and around; able to shower. States her headache resolved after IV Depacon. Tmax overnight 98.8. MRI brain and c-spine unremarkable. Patient still has intermittent confusion/word finding difficulty. Again discussed course of post-concussion syndrome and that she should practice brain rest such as limiting/eliminating screen time, etc.  She is aware PCS can sometimes take months to improve. She is already on Topamax and that should help. There was no family at the bedside at the time of encounter. MEDICAL HISTORY:  Past Medical History:   Diagnosis Date    Chronic kidney disease      History reviewed. No pertinent surgical history. Scheduled Meds:   morphine  2 mg IntraVENous Once    ARIPiprazole  30 mg Oral Daily    atomoxetine  40 mg Oral Daily    buPROPion  300 mg Oral Daily    cloNIDine  0.6 mg Oral Nightly    dextroamphetamine  10 mg Oral TID    prazosin  5 mg Oral Nightly    risperiDONE  2 mg Oral BID    topiramate  50 mg Oral BID    sodium chloride flush  5-40 mL IntraVENous 2 times per day     Continuous Infusions:   sodium chloride       PRN Meds:. LORazepam, sodium chloride flush, sodium chloride, ondansetron **OR** ondansetron, polyethylene glycol, acetaminophen **OR** acetaminophen, cyclobenzaprine, butalbital-acetaminophen-caffeine    Medications Prior to Admission: topiramate (TOPAMAX) 50 MG tablet, Take 1 tablet by mouth 2 times daily  risperiDONE (RISPERDAL) 2 MG tablet, Take 1 tablet by mouth See Admin Instructions 2 mg in AM   1 mg mid day( as needed)  2 mg in PM  LORazepam (ATIVAN) 0.5 MG tablet, Take 2 tablets by mouth nightly.   levonorgestrel (MIRENA, 52 MG,) IUD 52 mg, 1 Intra Uterine Device by IntraUTERine route once  dextroamphetamine

## 2023-08-01 NOTE — PROGRESS NOTES
Patient wanted me to inform the provider that she is having some blurred vision while attempting to read on her phone and dizziness currently. Rates her headache 6/10. Otherwise neuro check was wdl. Patient was offered zofran and fiorciet and she declined. Scheduled for mri of head and spine tomorrow.  Messaged on call provider via perfect serve will continue to monitor    Electronically signed by Yamilet Silva RN on 8/1/2023 at 2:15 AM

## 2023-08-01 NOTE — PROGRESS NOTES
Administered PRN Fiorciet for headache. Patient states that it provided slight relief.  Will continue to monitor    Electronically signed by Aparna Ramos RN on 7/31/2023 at 11:52 PM

## 2023-08-01 NOTE — DISCHARGE SUMMARY
Hospital Medicine Discharge Summary      Patient ID: Lydia Cabello , [id number]     Patient's PCP: No primary care provider on file. Admit Date: 7/31/2023     Discharge Date: 8/1/2023      Admitting Physician: Eleazar Luis MD    Discharge Physician: Marlena Mcbride MD     Discharge Diagnoses: Active Hospital Problems    Diagnosis Date Noted    Acute intractable headache, unspecified headache type [R51.9] 07/31/2023         The patient was seen and examined on the day of discharge and this discharge summary is in conjunction with any daily progress note from day of discharge. HOSPITAL COURSE    Patient demographics:  The patient  Lydia Cabello is a 24 y.o. female      Significant past medical history:       Patient Active Problem List   Diagnosis    Acute intractable headache, unspecified headache type            Presenting symptoms:  Fever, Headache     Diagnostic workup:   CT HEAD WO CONTRAST  MRI CERVICAL SPINE W WO CONTRAST   MRI BRAIN W WO CONTRAST       CONSULTS DURING ADMISSION :   IP CONSULT TO NEUROLOGY        Patient was diagnosed with:   fever and headache   CKD 3     Treatment while inpatient:  The patient is a 24 y.o. female with past medical history significant for       patient presented to the emergency department with fever and headache. Patient also reported an episode of for concussion. Headache was without migraine features. Imaging studies were negative. Neurology started the patient on Depakote and Topamax. Patient's headache improved. Patient can have also Fioricet on as-needed basis. Discharge Condition:  stable      Discharged to:  Home      Activity:   as tolerated: Follow Up: Follow-up with PCP in 1-2 weeks             Labs:  For convenience and continuity at follow-up the following most recent labs are provided:      CBC:   Lab Results   Component Value Date/Time    WBC 10.9 08/01/2023 05:19 AM    HGB 13.2 08/01/2023 05:19

## 2023-08-04 LAB
BACTERIA BLD CULT ORG #2: NORMAL
BACTERIA BLD CULT: NORMAL

## 2023-12-30 ENCOUNTER — OFFICE VISIT (OUTPATIENT)
Age: 23
End: 2023-12-30

## 2023-12-30 VITALS
OXYGEN SATURATION: 98 % | HEIGHT: 64 IN | DIASTOLIC BLOOD PRESSURE: 95 MMHG | TEMPERATURE: 98.5 F | BODY MASS INDEX: 36.7 KG/M2 | HEART RATE: 106 BPM | RESPIRATION RATE: 18 BRPM | WEIGHT: 215 LBS | SYSTOLIC BLOOD PRESSURE: 130 MMHG

## 2023-12-30 DIAGNOSIS — K04.7 DENTAL ABSCESS: Primary | ICD-10-CM

## 2023-12-30 RX ORDER — AMOXICILLIN 500 MG/1
500 CAPSULE ORAL 2 TIMES DAILY
Qty: 20 CAPSULE | Refills: 0 | Status: SHIPPED | OUTPATIENT
Start: 2023-12-30 | End: 2023-12-30 | Stop reason: ALTCHOICE

## 2023-12-30 RX ORDER — AMOXICILLIN 500 MG/1
500 CAPSULE ORAL 2 TIMES DAILY
Qty: 20 CAPSULE | Refills: 0 | Status: SHIPPED | OUTPATIENT
Start: 2023-12-30 | End: 2024-01-09

## 2023-12-30 RX ORDER — CLINDAMYCIN HYDROCHLORIDE 300 MG/1
300 CAPSULE ORAL 3 TIMES DAILY
Qty: 30 CAPSULE | Refills: 0 | Status: SHIPPED | OUTPATIENT
Start: 2023-12-30 | End: 2024-01-09

## 2023-12-30 RX ORDER — METHYLPREDNISOLONE 4 MG/1
TABLET ORAL
Qty: 1 KIT | Refills: 0 | Status: SHIPPED | OUTPATIENT
Start: 2023-12-30

## 2023-12-30 NOTE — PATIENT INSTRUCTIONS
- Steroids will cause your glucose (blood sugar) levels to rise. If you are diabetic, please monitor your glucose levels carefully. Watch your food intake and take caution with foods high in sugar and carbohydrates as weight gain is another side effect of steroid use. Elevated heart rate is another common finding with steroids. Take this medication with food as it can irritate your stomach to the same degree that ibuprofen would. Rule Buerger,    It was an absolute pleasure taking care of you today. I'm hoping you'll start feeling better as soon as possible. Please call me if you have any questions or concerns about what we talked about today. Feel free stop back in if anything changes or things seem to be getting worse. If for some reason you develop any serious or potentially life-threatening issues, call 911 or proceed to the nearest emergency department. Hopefully it will never come to that. Otherwise, it was very nice to meet you Rule Buerger.       Thanks,     Vickie Petersen

## 2024-02-08 ENCOUNTER — HOSPITAL ENCOUNTER (OUTPATIENT)
Age: 24
Setting detail: OBSERVATION
Discharge: HOME | End: 2024-02-09
Attending: EMERGENCY MEDICINE | Admitting: INTERNAL MEDICINE
Payer: COMMERCIAL

## 2024-02-08 ENCOUNTER — APPOINTMENT (OUTPATIENT)
Dept: CT IMAGING | Age: 24
End: 2024-02-08
Payer: COMMERCIAL

## 2024-02-08 DIAGNOSIS — R51.9 INTRACTABLE HEADACHE, UNSPECIFIED CHRONICITY PATTERN, UNSPECIFIED HEADACHE TYPE: ICD-10-CM

## 2024-02-08 DIAGNOSIS — I16.1 HYPERTENSIVE EMERGENCY: Primary | ICD-10-CM

## 2024-02-08 PROBLEM — I10 UNCONTROLLED HYPERTENSION: Status: ACTIVE | Noted: 2024-02-08

## 2024-02-08 LAB
ANION GAP SERPL CALCULATED.3IONS-SCNC: 14 MMOL/L (ref 3–16)
BACTERIA URNS QL MICRO: NORMAL /HPF
BASOPHILS # BLD: 0 K/UL (ref 0–0.2)
BASOPHILS NFR BLD: 0.3 %
BILIRUB UR QL STRIP.AUTO: NEGATIVE
BUN SERPL-MCNC: 11 MG/DL (ref 7–20)
CALCIUM SERPL-MCNC: 8.5 MG/DL (ref 8.3–10.6)
CHLORIDE SERPL-SCNC: 103 MMOL/L (ref 99–110)
CLARITY UR: CLEAR
CO2 SERPL-SCNC: 23 MMOL/L (ref 21–32)
COLOR UR: YELLOW
CREAT SERPL-MCNC: 1.2 MG/DL (ref 0.6–1.1)
DEPRECATED RDW RBC AUTO: 12.9 % (ref 12.4–15.4)
EOSINOPHIL # BLD: 0.1 K/UL (ref 0–0.6)
EOSINOPHIL NFR BLD: 0.4 %
EPI CELLS #/AREA URNS AUTO: 3 /HPF (ref 0–5)
FLUAV RNA UPPER RESP QL NAA+PROBE: NEGATIVE
FLUBV AG NPH QL: NEGATIVE
GFR SERPLBLD CREATININE-BSD FMLA CKD-EPI: >60 ML/MIN/{1.73_M2}
GLUCOSE SERPL-MCNC: 118 MG/DL (ref 70–99)
GLUCOSE UR STRIP.AUTO-MCNC: NEGATIVE MG/DL
HCG UR QL: NEGATIVE
HCT VFR BLD AUTO: 44.6 % (ref 36–48)
HGB BLD-MCNC: 14.8 G/DL (ref 12–16)
HGB UR QL STRIP.AUTO: ABNORMAL
HYALINE CASTS #/AREA URNS AUTO: 0 /LPF (ref 0–8)
KETONES UR STRIP.AUTO-MCNC: NEGATIVE MG/DL
LEUKOCYTE ESTERASE UR QL STRIP.AUTO: NEGATIVE
LYMPHOCYTES # BLD: 2.7 K/UL (ref 1–5.1)
LYMPHOCYTES NFR BLD: 18 %
MCH RBC QN AUTO: 29.3 PG (ref 26–34)
MCHC RBC AUTO-ENTMCNC: 33.2 G/DL (ref 31–36)
MCV RBC AUTO: 88.3 FL (ref 80–100)
MONOCYTES # BLD: 0.8 K/UL (ref 0–1.3)
MONOCYTES NFR BLD: 5.3 %
NEUTROPHILS # BLD: 11.4 K/UL (ref 1.7–7.7)
NEUTROPHILS NFR BLD: 76 %
NITRITE UR QL STRIP.AUTO: NEGATIVE
NT-PROBNP SERPL-MCNC: 65 PG/ML (ref 0–124)
PH UR STRIP.AUTO: 6.5 [PH] (ref 5–8)
PLATELET # BLD AUTO: 410 K/UL (ref 135–450)
PMV BLD AUTO: 7.2 FL (ref 5–10.5)
POTASSIUM SERPL-SCNC: 4 MMOL/L (ref 3.5–5.1)
PROT UR STRIP.AUTO-MCNC: NEGATIVE MG/DL
RBC # BLD AUTO: 5.06 M/UL (ref 4–5.2)
RBC CLUMPS #/AREA URNS AUTO: 2 /HPF (ref 0–4)
SARS-COV-2 RDRP RESP QL NAA+PROBE: NOT DETECTED
SODIUM SERPL-SCNC: 140 MMOL/L (ref 136–145)
SP GR UR STRIP.AUTO: 1.01 (ref 1–1.03)
TROPONIN, HIGH SENSITIVITY: 7 NG/L (ref 0–14)
UA COMPLETE W REFLEX CULTURE PNL UR: ABNORMAL
UA DIPSTICK W REFLEX MICRO PNL UR: YES
URN SPEC COLLECT METH UR: ABNORMAL
UROBILINOGEN UR STRIP-ACNC: 1 E.U./DL
WBC # BLD AUTO: 14.9 K/UL (ref 4–11)
WBC #/AREA URNS AUTO: 2 /HPF (ref 0–5)

## 2024-02-08 PROCEDURE — 99285 EMERGENCY DEPT VISIT HI MDM: CPT

## 2024-02-08 PROCEDURE — 93005 ELECTROCARDIOGRAM TRACING: CPT | Performed by: INTERNAL MEDICINE

## 2024-02-08 PROCEDURE — 6360000002 HC RX W HCPCS: Performed by: EMERGENCY MEDICINE

## 2024-02-08 PROCEDURE — 96374 THER/PROPH/DIAG INJ IV PUSH: CPT

## 2024-02-08 PROCEDURE — 84703 CHORIONIC GONADOTROPIN ASSAY: CPT

## 2024-02-08 PROCEDURE — 70450 CT HEAD/BRAIN W/O DYE: CPT

## 2024-02-08 PROCEDURE — 83880 ASSAY OF NATRIURETIC PEPTIDE: CPT

## 2024-02-08 PROCEDURE — 2580000003 HC RX 258: Performed by: EMERGENCY MEDICINE

## 2024-02-08 PROCEDURE — 85025 COMPLETE CBC W/AUTO DIFF WBC: CPT

## 2024-02-08 PROCEDURE — 81001 URINALYSIS AUTO W/SCOPE: CPT

## 2024-02-08 PROCEDURE — 6370000000 HC RX 637 (ALT 250 FOR IP): Performed by: EMERGENCY MEDICINE

## 2024-02-08 PROCEDURE — 6370000000 HC RX 637 (ALT 250 FOR IP): Performed by: INTERNAL MEDICINE

## 2024-02-08 PROCEDURE — 96361 HYDRATE IV INFUSION ADD-ON: CPT

## 2024-02-08 PROCEDURE — G0378 HOSPITAL OBSERVATION PER HR: HCPCS

## 2024-02-08 PROCEDURE — 80048 BASIC METABOLIC PNL TOTAL CA: CPT

## 2024-02-08 PROCEDURE — 36415 COLL VENOUS BLD VENIPUNCTURE: CPT

## 2024-02-08 PROCEDURE — 87804 INFLUENZA ASSAY W/OPTIC: CPT

## 2024-02-08 PROCEDURE — 96375 TX/PRO/DX INJ NEW DRUG ADDON: CPT

## 2024-02-08 PROCEDURE — 87635 SARS-COV-2 COVID-19 AMP PRB: CPT

## 2024-02-08 PROCEDURE — 84484 ASSAY OF TROPONIN QUANT: CPT

## 2024-02-08 RX ORDER — ATENOLOL 25 MG/1
25 TABLET ORAL DAILY
Status: DISCONTINUED | OUTPATIENT
Start: 2024-02-09 | End: 2024-02-09

## 2024-02-08 RX ORDER — CLONIDINE HYDROCHLORIDE 0.1 MG/1
0.6 TABLET ORAL NIGHTLY
Status: DISCONTINUED | OUTPATIENT
Start: 2024-02-08 | End: 2024-02-09 | Stop reason: HOSPADM

## 2024-02-08 RX ORDER — POTASSIUM CHLORIDE 20 MEQ/1
40 TABLET, EXTENDED RELEASE ORAL PRN
Status: DISCONTINUED | OUTPATIENT
Start: 2024-02-08 | End: 2024-02-09 | Stop reason: HOSPADM

## 2024-02-08 RX ORDER — RISPERIDONE 1 MG/1
2 TABLET ORAL 2 TIMES DAILY
Status: DISCONTINUED | OUTPATIENT
Start: 2024-02-08 | End: 2024-02-09 | Stop reason: HOSPADM

## 2024-02-08 RX ORDER — MAGNESIUM HYDROXIDE/ALUMINUM HYDROXICE/SIMETHICONE 120; 1200; 1200 MG/30ML; MG/30ML; MG/30ML
30 SUSPENSION ORAL EVERY 6 HOURS PRN
Status: DISCONTINUED | OUTPATIENT
Start: 2024-02-08 | End: 2024-02-09 | Stop reason: HOSPADM

## 2024-02-08 RX ORDER — CLONIDINE HYDROCHLORIDE 0.1 MG/1
0.3 TABLET ORAL ONCE
Status: COMPLETED | OUTPATIENT
Start: 2024-02-08 | End: 2024-02-08

## 2024-02-08 RX ORDER — KETOROLAC TROMETHAMINE 30 MG/ML
30 INJECTION, SOLUTION INTRAMUSCULAR; INTRAVENOUS EVERY 6 HOURS PRN
Status: DISCONTINUED | OUTPATIENT
Start: 2024-02-08 | End: 2024-02-09 | Stop reason: HOSPADM

## 2024-02-08 RX ORDER — KETOROLAC TROMETHAMINE 15 MG/ML
15 INJECTION, SOLUTION INTRAMUSCULAR; INTRAVENOUS ONCE
Status: COMPLETED | OUTPATIENT
Start: 2024-02-08 | End: 2024-02-08

## 2024-02-08 RX ORDER — 0.9 % SODIUM CHLORIDE 0.9 %
1000 INTRAVENOUS SOLUTION INTRAVENOUS ONCE
Status: COMPLETED | OUTPATIENT
Start: 2024-02-08 | End: 2024-02-08

## 2024-02-08 RX ORDER — DEXTROAMPHETAMINE SULFATE 10 MG/1
10 TABLET ORAL 2 TIMES DAILY
Status: DISCONTINUED | OUTPATIENT
Start: 2024-02-09 | End: 2024-02-09 | Stop reason: HOSPADM

## 2024-02-08 RX ORDER — POTASSIUM CHLORIDE 7.45 MG/ML
10 INJECTION INTRAVENOUS PRN
Status: DISCONTINUED | OUTPATIENT
Start: 2024-02-08 | End: 2024-02-09 | Stop reason: HOSPADM

## 2024-02-08 RX ORDER — ONDANSETRON 4 MG/1
4 TABLET, ORALLY DISINTEGRATING ORAL EVERY 8 HOURS PRN
Status: DISCONTINUED | OUTPATIENT
Start: 2024-02-08 | End: 2024-02-09 | Stop reason: HOSPADM

## 2024-02-08 RX ORDER — HYDRALAZINE HYDROCHLORIDE 20 MG/ML
10 INJECTION INTRAMUSCULAR; INTRAVENOUS EVERY 4 HOURS PRN
Status: DISCONTINUED | OUTPATIENT
Start: 2024-02-08 | End: 2024-02-09 | Stop reason: HOSPADM

## 2024-02-08 RX ORDER — SODIUM CHLORIDE, SODIUM LACTATE, POTASSIUM CHLORIDE, CALCIUM CHLORIDE 600; 310; 30; 20 MG/100ML; MG/100ML; MG/100ML; MG/100ML
INJECTION, SOLUTION INTRAVENOUS CONTINUOUS
Status: ACTIVE | OUTPATIENT
Start: 2024-02-08 | End: 2024-02-09

## 2024-02-08 RX ORDER — LORAZEPAM 1 MG/1
1 TABLET ORAL NIGHTLY PRN
Status: DISCONTINUED | OUTPATIENT
Start: 2024-02-08 | End: 2024-02-09 | Stop reason: HOSPADM

## 2024-02-08 RX ORDER — SODIUM CHLORIDE 0.9 % (FLUSH) 0.9 %
5-40 SYRINGE (ML) INJECTION PRN
Status: DISCONTINUED | OUTPATIENT
Start: 2024-02-08 | End: 2024-02-09 | Stop reason: HOSPADM

## 2024-02-08 RX ORDER — BUTALBITAL, ACETAMINOPHEN AND CAFFEINE 50; 325; 40 MG/1; MG/1; MG/1
1 TABLET ORAL EVERY 4 HOURS PRN
Status: DISCONTINUED | OUTPATIENT
Start: 2024-02-08 | End: 2024-02-09 | Stop reason: HOSPADM

## 2024-02-08 RX ORDER — ATOMOXETINE 40 MG/1
40 CAPSULE ORAL DAILY
Status: DISCONTINUED | OUTPATIENT
Start: 2024-02-09 | End: 2024-02-08

## 2024-02-08 RX ORDER — SODIUM CHLORIDE 0.9 % (FLUSH) 0.9 %
5-40 SYRINGE (ML) INJECTION EVERY 12 HOURS SCHEDULED
Status: DISCONTINUED | OUTPATIENT
Start: 2024-02-08 | End: 2024-02-09 | Stop reason: HOSPADM

## 2024-02-08 RX ORDER — DIPHENHYDRAMINE HYDROCHLORIDE 50 MG/ML
25 INJECTION INTRAMUSCULAR; INTRAVENOUS ONCE
Status: COMPLETED | OUTPATIENT
Start: 2024-02-08 | End: 2024-02-08

## 2024-02-08 RX ORDER — HYDROXYZINE HYDROCHLORIDE 25 MG/1
25 TABLET, FILM COATED ORAL EVERY 4 HOURS PRN
Status: DISCONTINUED | OUTPATIENT
Start: 2024-02-08 | End: 2024-02-09 | Stop reason: HOSPADM

## 2024-02-08 RX ORDER — RISPERIDONE 1 MG/1
1 TABLET ORAL DAILY
Status: DISCONTINUED | OUTPATIENT
Start: 2024-02-09 | End: 2024-02-09 | Stop reason: HOSPADM

## 2024-02-08 RX ORDER — ARIPIPRAZOLE 10 MG/1
30 TABLET ORAL DAILY
Status: DISCONTINUED | OUTPATIENT
Start: 2024-02-09 | End: 2024-02-09 | Stop reason: HOSPADM

## 2024-02-08 RX ORDER — LABETALOL HYDROCHLORIDE 5 MG/ML
10 INJECTION, SOLUTION INTRAVENOUS ONCE
Status: COMPLETED | OUTPATIENT
Start: 2024-02-08 | End: 2024-02-08

## 2024-02-08 RX ORDER — TRAMADOL HYDROCHLORIDE 50 MG/1
50 TABLET ORAL EVERY 6 HOURS PRN
Status: DISCONTINUED | OUTPATIENT
Start: 2024-02-08 | End: 2024-02-09 | Stop reason: HOSPADM

## 2024-02-08 RX ORDER — TOPIRAMATE 50 MG/1
50 TABLET, FILM COATED ORAL 2 TIMES DAILY
Status: DISCONTINUED | OUTPATIENT
Start: 2024-02-08 | End: 2024-02-08

## 2024-02-08 RX ORDER — SODIUM CHLORIDE 9 MG/ML
INJECTION, SOLUTION INTRAVENOUS PRN
Status: DISCONTINUED | OUTPATIENT
Start: 2024-02-08 | End: 2024-02-09 | Stop reason: HOSPADM

## 2024-02-08 RX ORDER — PROCHLORPERAZINE EDISYLATE 5 MG/ML
10 INJECTION INTRAMUSCULAR; INTRAVENOUS EVERY 6 HOURS PRN
Status: DISCONTINUED | OUTPATIENT
Start: 2024-02-08 | End: 2024-02-09 | Stop reason: HOSPADM

## 2024-02-08 RX ORDER — MAGNESIUM SULFATE IN WATER 40 MG/ML
2000 INJECTION, SOLUTION INTRAVENOUS PRN
Status: DISCONTINUED | OUTPATIENT
Start: 2024-02-08 | End: 2024-02-09 | Stop reason: HOSPADM

## 2024-02-08 RX ORDER — RISPERIDONE 1 MG/1
2 TABLET ORAL SEE ADMIN INSTRUCTIONS
Status: DISCONTINUED | OUTPATIENT
Start: 2024-02-08 | End: 2024-02-08

## 2024-02-08 RX ORDER — ACETAMINOPHEN 325 MG/1
650 TABLET ORAL EVERY 6 HOURS PRN
Status: DISCONTINUED | OUTPATIENT
Start: 2024-02-08 | End: 2024-02-09 | Stop reason: HOSPADM

## 2024-02-08 RX ORDER — TRAMADOL HYDROCHLORIDE 50 MG/1
100 TABLET ORAL EVERY 6 HOURS PRN
Status: DISCONTINUED | OUTPATIENT
Start: 2024-02-08 | End: 2024-02-09 | Stop reason: HOSPADM

## 2024-02-08 RX ORDER — BUPROPION HYDROCHLORIDE 150 MG/1
450 TABLET ORAL DAILY
Status: DISCONTINUED | OUTPATIENT
Start: 2024-02-09 | End: 2024-02-09 | Stop reason: HOSPADM

## 2024-02-08 RX ORDER — ACETAMINOPHEN 650 MG/1
650 SUPPOSITORY RECTAL EVERY 6 HOURS PRN
Status: DISCONTINUED | OUTPATIENT
Start: 2024-02-08 | End: 2024-02-09 | Stop reason: HOSPADM

## 2024-02-08 RX ORDER — BUPROPION HYDROCHLORIDE 150 MG/1
300 TABLET ORAL DAILY
Status: DISCONTINUED | OUTPATIENT
Start: 2024-02-09 | End: 2024-02-08

## 2024-02-08 RX ORDER — ONDANSETRON 2 MG/ML
4 INJECTION INTRAMUSCULAR; INTRAVENOUS EVERY 6 HOURS PRN
Status: DISCONTINUED | OUTPATIENT
Start: 2024-02-08 | End: 2024-02-09 | Stop reason: HOSPADM

## 2024-02-08 RX ORDER — POLYETHYLENE GLYCOL 3350 17 G/17G
17 POWDER, FOR SOLUTION ORAL DAILY PRN
Status: DISCONTINUED | OUTPATIENT
Start: 2024-02-08 | End: 2024-02-09 | Stop reason: HOSPADM

## 2024-02-08 RX ADMIN — KETOROLAC TROMETHAMINE 15 MG: 15 INJECTION, SOLUTION INTRAMUSCULAR; INTRAVENOUS at 20:27

## 2024-02-08 RX ADMIN — SODIUM CHLORIDE 1000 ML: 9 INJECTION, SOLUTION INTRAVENOUS at 19:25

## 2024-02-08 RX ADMIN — TRAMADOL HYDROCHLORIDE 100 MG: 50 TABLET ORAL at 22:52

## 2024-02-08 RX ADMIN — PROCHLORPERAZINE EDISYLATE 10 MG: 5 INJECTION INTRAMUSCULAR; INTRAVENOUS at 19:27

## 2024-02-08 RX ADMIN — DIPHENHYDRAMINE HYDROCHLORIDE 25 MG: 50 INJECTION INTRAMUSCULAR; INTRAVENOUS at 19:26

## 2024-02-08 RX ADMIN — CLONIDINE HYDROCHLORIDE 0.3 MG: 0.1 TABLET ORAL at 20:43

## 2024-02-08 RX ADMIN — BUTALBITAL, ACETAMINOPHEN AND CAFFEINE 1 TABLET: 325; 50; 40 TABLET ORAL at 20:43

## 2024-02-08 RX ADMIN — LABETALOL HYDROCHLORIDE 10 MG: 5 INJECTION, SOLUTION INTRAVENOUS at 22:53

## 2024-02-08 ASSESSMENT — PAIN DESCRIPTION - DESCRIPTORS
DESCRIPTORS: DISCOMFORT;ACHING
DESCRIPTORS: DISCOMFORT

## 2024-02-08 ASSESSMENT — PAIN - FUNCTIONAL ASSESSMENT
PAIN_FUNCTIONAL_ASSESSMENT: 0-10
PAIN_FUNCTIONAL_ASSESSMENT: ACTIVITIES ARE NOT PREVENTED

## 2024-02-08 ASSESSMENT — PAIN DESCRIPTION - LOCATION
LOCATION: HEAD

## 2024-02-08 ASSESSMENT — PAIN DESCRIPTION - ORIENTATION
ORIENTATION: LEFT;ANTERIOR
ORIENTATION: LEFT;ANTERIOR

## 2024-02-08 ASSESSMENT — PAIN DESCRIPTION - PAIN TYPE: TYPE: ACUTE PAIN

## 2024-02-08 ASSESSMENT — LIFESTYLE VARIABLES
HOW OFTEN DO YOU HAVE A DRINK CONTAINING ALCOHOL: NEVER
HOW MANY STANDARD DRINKS CONTAINING ALCOHOL DO YOU HAVE ON A TYPICAL DAY: PATIENT DOES NOT DRINK

## 2024-02-08 ASSESSMENT — PAIN SCALES - GENERAL
PAINLEVEL_OUTOF10: 9
PAINLEVEL_OUTOF10: 9
PAINLEVEL_OUTOF10: 8
PAINLEVEL_OUTOF10: 8

## 2024-02-08 NOTE — ED TRIAGE NOTES
Pt has hx HTN and used to take medication to treat. Pt unsure of last dose or why she is not taking any at this time.

## 2024-02-08 NOTE — ED PROVIDER NOTES
[facility] EMERGENCY DEPARTMENT    CHIEF COMPLAINT  Dizziness (C/o dizziness and feeling \"weird & confused\" at work. Pt denies ever having anything like this before. En route via EMS, pt c/o headache and spotty vision. Pt does wear glasses. Pt was hypertensive via EMS. Pt slow to respond in triage and states \"I don't know how I feel\". Headache pain 9/10, mid epigastric pain 7/10)       HISTORY OF PRESENT ILLNESS  Margy Chong is a 23 y.o. female who presents to the ED complaining of confusion and lightheadedness and \"out of it.\" Complains of headache x 2-3 weeks, pulsating, waxes and wanes in intensity. Has headaches previously, though this does feel different. Advil helps a bit. The pain is at the bilateral temples and behind the eyes and radiates to the jaw. The confusion/lightheadedness started today. Fever 1.5 weeks ago, none since. Denies chest pain, SOB. Nausea and \"spit up\" while at work. Denies diarrhea. Denies sick contacts or recent travel. Complains of myalgias and generalized weakness. No focal weakness, numbness. Pt reports concussion end of June last year. Was supposed to go to rehab but did not given expense and time considerations. Has felt that since the concussion, she's had issues with balance. Reports h/o hypertension. Prescribed clonidine 0.3 mg nightly.     Noted discharge summary dated 8/1/2023 which is the patient was admitted to the ED with concern for fever and headache and also with concern for concussion.  She had imaging studies including MRI brain that were unrevealing.  She was started on Depakote and Topamax.  The patient reports that she no longer takes Topamax.    Later in the ED course, patient reports she may have struck her head on a cabinet 2 weeks ago and thinks this may have provoked her symptoms.     I have reviewed the following from the nursing documentation:    Past Medical History:   Diagnosis Date    ADHD     Chronic kidney disease     Depression      criteria - the patient is NOT to be included for SEP-1 Core Measure due to:  Infection is not suspected    IDonald MD, am the primary clinician of record.     During the patient's ED course, the patient was given:  Medications   prochlorperazine (COMPAZINE) injection 10 mg (10 mg IntraVENous Given 2/8/24 1927)   butalbital-acetaminophen-caffeine (FIORICET, ESGIC) per tablet 1 tablet (1 tablet Oral Given 2/8/24 2043)   labetalol (NORMODYNE;TRANDATE) injection 10 mg (has no administration in time range)   sodium chloride 0.9 % bolus 1,000 mL (0 mLs IntraVENous Stopped 2/8/24 2046)   diphenhydrAMINE (BENADRYL) injection 25 mg (25 mg IntraVENous Given 2/8/24 1926)   ketorolac (TORADOL) injection 15 mg (15 mg IntraVENous Given 2/8/24 2027)   cloNIDine (CATAPRES) tablet 0.3 mg (0.3 mg Oral Given 2/8/24 2043)        All questions were answered and the patient/family expressed understanding and agreement with the plan.     PROCEDURES  None    CRITICAL CARE  N/A    CLINICAL IMPRESSION  1. Hypertensive emergency    2. Intractable headache, unspecified chronicity pattern, unspecified headache type        DISPOSITION  Decision To Admit 02/08/2024 08:53:59 PM     Donald Bolton MD    Note: This chart was created using voice recognition dictation software. Efforts were made by me to ensure accuracy, however some errors may be present due to limitations of this technology and occasionally words are not transcribed correctly.       Donald Bolton MD  02/08/24 2103

## 2024-02-08 NOTE — ED NOTES
Pt given urine cup to obtain urine sample. Pt self ambulated to the bathroom in no apparent distress.

## 2024-02-08 NOTE — ED TRIAGE NOTES
Pt to ER from work at [employer] via EMS C/o dizziness and feeling \"weird & confused\" at work. Pt denies ever having anything like this before. En route via EMS, pt c/o headache and spotty vision. Pt does wear glasses. Pt was hypertensive via EMS. Pt slow to respond in triage and states \"I don't know how I feel\". Headache pain 9/10, mid epigastric pain 7/10

## 2024-02-09 VITALS
HEART RATE: 84 BPM | HEIGHT: 64 IN | SYSTOLIC BLOOD PRESSURE: 145 MMHG | DIASTOLIC BLOOD PRESSURE: 82 MMHG | OXYGEN SATURATION: 98 % | RESPIRATION RATE: 16 BRPM | BODY MASS INDEX: 37.79 KG/M2 | WEIGHT: 221.34 LBS | TEMPERATURE: 98.1 F

## 2024-02-09 PROBLEM — R07.9 CHEST PAIN AT REST: Status: ACTIVE | Noted: 2024-02-09

## 2024-02-09 LAB
ANION GAP SERPL CALCULATED.3IONS-SCNC: 10 MMOL/L (ref 3–16)
BASOPHILS # BLD: 0.1 K/UL (ref 0–0.2)
BASOPHILS NFR BLD: 0.6 %
BUN SERPL-MCNC: 11 MG/DL (ref 7–20)
CALCIUM SERPL-MCNC: 8.3 MG/DL (ref 8.3–10.6)
CHLORIDE SERPL-SCNC: 107 MMOL/L (ref 99–110)
CO2 SERPL-SCNC: 22 MMOL/L (ref 21–32)
CREAT SERPL-MCNC: 1.2 MG/DL (ref 0.6–1.1)
DEPRECATED RDW RBC AUTO: 12.7 % (ref 12.4–15.4)
EKG ATRIAL RATE: 74 BPM
EKG DIAGNOSIS: NORMAL
EKG P AXIS: 37 DEGREES
EKG P-R INTERVAL: 154 MS
EKG Q-T INTERVAL: 378 MS
EKG QRS DURATION: 82 MS
EKG QTC CALCULATION (BAZETT): 419 MS
EKG R AXIS: 45 DEGREES
EKG T AXIS: 25 DEGREES
EKG VENTRICULAR RATE: 74 BPM
EOSINOPHIL # BLD: 0.2 K/UL (ref 0–0.6)
EOSINOPHIL NFR BLD: 1.3 %
GFR SERPLBLD CREATININE-BSD FMLA CKD-EPI: >60 ML/MIN/{1.73_M2}
GLUCOSE SERPL-MCNC: 100 MG/DL (ref 70–99)
HCT VFR BLD AUTO: 36.6 % (ref 36–48)
HGB BLD-MCNC: 11.9 G/DL (ref 12–16)
LYMPHOCYTES # BLD: 3.3 K/UL (ref 1–5.1)
LYMPHOCYTES NFR BLD: 25.3 %
MCH RBC QN AUTO: 28.7 PG (ref 26–34)
MCHC RBC AUTO-ENTMCNC: 32.6 G/DL (ref 31–36)
MCV RBC AUTO: 88 FL (ref 80–100)
MONOCYTES # BLD: 0.8 K/UL (ref 0–1.3)
MONOCYTES NFR BLD: 6.3 %
NEUTROPHILS # BLD: 8.6 K/UL (ref 1.7–7.7)
NEUTROPHILS NFR BLD: 66.5 %
PLATELET # BLD AUTO: 368 K/UL (ref 135–450)
PMV BLD AUTO: 7.4 FL (ref 5–10.5)
POTASSIUM SERPL-SCNC: 3.7 MMOL/L (ref 3.5–5.1)
RBC # BLD AUTO: 4.15 M/UL (ref 4–5.2)
SODIUM SERPL-SCNC: 139 MMOL/L (ref 136–145)
WBC # BLD AUTO: 12.9 K/UL (ref 4–11)

## 2024-02-09 PROCEDURE — G0378 HOSPITAL OBSERVATION PER HR: HCPCS

## 2024-02-09 PROCEDURE — 6370000000 HC RX 637 (ALT 250 FOR IP): Performed by: EMERGENCY MEDICINE

## 2024-02-09 PROCEDURE — 6370000000 HC RX 637 (ALT 250 FOR IP): Performed by: REGISTERED NURSE

## 2024-02-09 PROCEDURE — 93306 TTE W/DOPPLER COMPLETE: CPT

## 2024-02-09 PROCEDURE — 2580000003 HC RX 258: Performed by: INTERNAL MEDICINE

## 2024-02-09 PROCEDURE — 93010 ELECTROCARDIOGRAM REPORT: CPT | Performed by: INTERNAL MEDICINE

## 2024-02-09 PROCEDURE — 6370000000 HC RX 637 (ALT 250 FOR IP): Performed by: HOSPITALIST

## 2024-02-09 PROCEDURE — 80048 BASIC METABOLIC PNL TOTAL CA: CPT

## 2024-02-09 PROCEDURE — 94760 N-INVAS EAR/PLS OXIMETRY 1: CPT

## 2024-02-09 PROCEDURE — 6370000000 HC RX 637 (ALT 250 FOR IP): Performed by: INTERNAL MEDICINE

## 2024-02-09 PROCEDURE — 85025 COMPLETE CBC W/AUTO DIFF WBC: CPT

## 2024-02-09 PROCEDURE — 36415 COLL VENOUS BLD VENIPUNCTURE: CPT

## 2024-02-09 RX ORDER — RISPERIDONE 1 MG/1
1 TABLET ORAL
COMMUNITY
Start: 2023-11-27

## 2024-02-09 RX ORDER — ATENOLOL 50 MG/1
50 TABLET ORAL DAILY
Status: DISCONTINUED | OUTPATIENT
Start: 2024-02-09 | End: 2024-02-09 | Stop reason: HOSPADM

## 2024-02-09 RX ORDER — BUTALBITAL, ACETAMINOPHEN, CAFFEINE AND CODEINE PHOSPHATE 300; 50; 40; 30 MG/1; MG/1; MG/1; MG/1
1 CAPSULE ORAL EVERY 4 HOURS PRN
Qty: 30 CAPSULE | Refills: 0 | Status: SHIPPED | OUTPATIENT
Start: 2024-02-09 | End: 2024-02-09

## 2024-02-09 RX ORDER — ATENOLOL 50 MG/1
50 TABLET ORAL DAILY
Qty: 30 TABLET | Refills: 3 | Status: SHIPPED | OUTPATIENT
Start: 2024-02-09

## 2024-02-09 RX ORDER — ATENOLOL 50 MG/1
50 TABLET ORAL DAILY
Qty: 30 TABLET | Refills: 3 | Status: SHIPPED | OUTPATIENT
Start: 2024-02-09 | End: 2024-02-09

## 2024-02-09 RX ORDER — BUTALBITAL, ACETAMINOPHEN, CAFFEINE AND CODEINE PHOSPHATE 300; 50; 40; 30 MG/1; MG/1; MG/1; MG/1
1 CAPSULE ORAL EVERY 4 HOURS PRN
Qty: 30 CAPSULE | Refills: 0 | Status: SHIPPED | OUTPATIENT
Start: 2024-02-09 | End: 2024-02-19

## 2024-02-09 RX ADMIN — ARIPIPRAZOLE 30 MG: 10 TABLET ORAL at 08:37

## 2024-02-09 RX ADMIN — CLONIDINE HYDROCHLORIDE 0.6 MG: 0.1 TABLET ORAL at 00:47

## 2024-02-09 RX ADMIN — RISPERIDONE 2 MG: 1 TABLET ORAL at 00:46

## 2024-02-09 RX ADMIN — RISPERIDONE 2 MG: 1 TABLET ORAL at 08:36

## 2024-02-09 RX ADMIN — BUPROPION HYDROCHLORIDE 450 MG: 150 TABLET, EXTENDED RELEASE ORAL at 08:38

## 2024-02-09 RX ADMIN — HYDROXYZINE HYDROCHLORIDE 25 MG: 25 TABLET, FILM COATED ORAL at 00:47

## 2024-02-09 RX ADMIN — LORAZEPAM 1 MG: 1 TABLET ORAL at 00:46

## 2024-02-09 RX ADMIN — ATENOLOL 25 MG: 25 TABLET ORAL at 08:36

## 2024-02-09 RX ADMIN — BUTALBITAL, ACETAMINOPHEN AND CAFFEINE 1 TABLET: 325; 50; 40 TABLET ORAL at 16:32

## 2024-02-09 RX ADMIN — ATOMOXETINE 60 MG: 40 CAPSULE ORAL at 08:36

## 2024-02-09 RX ADMIN — ATENOLOL 50 MG: 50 TABLET ORAL at 18:03

## 2024-02-09 RX ADMIN — SODIUM CHLORIDE, POTASSIUM CHLORIDE, SODIUM LACTATE AND CALCIUM CHLORIDE: 600; 310; 30; 20 INJECTION, SOLUTION INTRAVENOUS at 00:06

## 2024-02-09 RX ADMIN — BUTALBITAL, ACETAMINOPHEN AND CAFFEINE 1 TABLET: 325; 50; 40 TABLET ORAL at 00:47

## 2024-02-09 ASSESSMENT — PAIN DESCRIPTION - LOCATION
LOCATION: HEAD
LOCATION: HEAD

## 2024-02-09 ASSESSMENT — PAIN DESCRIPTION - ONSET: ONSET: ON-GOING

## 2024-02-09 ASSESSMENT — PAIN DESCRIPTION - DESCRIPTORS: DESCRIPTORS: ACHING

## 2024-02-09 ASSESSMENT — PAIN - FUNCTIONAL ASSESSMENT: PAIN_FUNCTIONAL_ASSESSMENT: ACTIVITIES ARE NOT PREVENTED

## 2024-02-09 ASSESSMENT — PAIN DESCRIPTION - PAIN TYPE: TYPE: ACUTE PAIN

## 2024-02-09 ASSESSMENT — PAIN DESCRIPTION - ORIENTATION: ORIENTATION: LEFT;ANTERIOR

## 2024-02-09 ASSESSMENT — PAIN SCALES - GENERAL
PAINLEVEL_OUTOF10: 8
PAINLEVEL_OUTOF10: 6
PAINLEVEL_OUTOF10: 5

## 2024-02-09 ASSESSMENT — PAIN DESCRIPTION - FREQUENCY: FREQUENCY: CONTINUOUS

## 2024-02-09 NOTE — PROGRESS NOTES
4 Eyes Skin Assessment     NAME:  Margy Chong  YOB: 2000  MEDICAL RECORD NUMBER:  [id number]    The patient is being assessed for  Admission    I agree that at least one RN has performed a thorough Head to Toe Skin Assessment on the patient. ALL assessment sites listed below have been assessed.      Areas assessed by both nurses:    Head, Face, Ears, Shoulders, Back, Chest, Arms, Elbows, Hands, Sacrum. Buttock, Coccyx, Ischium, Legs. Feet and Heels, and Under Medical Devices         Does the Patient have a Wound? No noted wound(s)       [place] Prevention initiated by RN: No  Wound Care Orders initiated by RN: No    Pressure Injury (Stage 3,4, Unstageable, DTI, NWPT, and Complex wounds) if present, place Wound referral order by RN under : No    New Ostomies, if present place, Ostomy referral order under : No     Nurse 1 eSignature: Electronically signed by Charly Pettit RN on 2/9/24 at 4:46 AM EST    **SHARE this note so that the co-signing nurse can place an eSignature**    Nurse 2 eSignature: Electronically signed by Carlos Lucio RN on 2/9/24 at 4:49 AM EST

## 2024-02-09 NOTE — PROGRESS NOTES
Hospitalist Progress Note  2/9/2024 11:59 AM    PCP: No primary care provider on file.    [id number]     Date of Admission: 2/8/2024                                                                                                                     HOSPITAL COURSE    Patient demographics:  The patient  Margy Chong is a 23 y.o. female     Significant past medical history:   Patient Active Problem List   Diagnosis    Acute intractable headache, unspecified headache type    Intractable headache, unspecified chronicity pattern, unspecified headache type    Uncontrolled hypertension    Chest pain at rest         Presenting symptoms:  Headache    Diagnostic workup:      CONSULTS DURING ADMISSION :   IP CONSULT TO HOSPITALIST      Patient was diagnosed with:  Intractable headache  Uncontrolled hypertension   Chest pain    Treatment while inpatient:  Margy Chong is a 23 y.o. female with past medical history significant for  migraines, bipolar disorder and questionable hypertension.     patient  presented with 3 weeks of left frontal headache with associated noise and light sensitivity but no nausea or vomiting.                                                                                       ----------------------------------------------------------      SUBJECTIVE COMPLAINTS- Chest pain    Diet: ADULT DIET; Regular; No Added Salt (3-4 gm)      OBJECTIVE:   Patient Active Problem List   Diagnosis    Acute intractable headache, unspecified headache type    Intractable headache, unspecified chronicity pattern, unspecified headache type    Uncontrolled hypertension    Chest pain at rest       Allergies  Latex and Oseltamivir    Medications    Scheduled Meds:   sodium chloride flush  5-40 mL IntraVENous 2 times per day    ARIPiprazole  30 mg Oral Daily    cloNIDine  0.6 mg Oral Nightly    dextroamphetamine  10 mg Oral BID    atenolol  25 mg Oral Daily    risperiDONE  2 mg Oral BID    And    risperiDONE  1 mg

## 2024-02-09 NOTE — DISCHARGE INSTRUCTIONS
Follow-up with your medical practitioner in 5-7 days . Can see neurologist of your choice to follow-up on headaches

## 2024-02-09 NOTE — H&P
V2.0  History and Physical      Name:  Margy Chong /Age/Sex: 2000  (23 y.o. female)   MRN & CSN:  [id number] & [id number] Encounter Date/Time: 2024 9:56 PM EST   Location:   PCP: No primary care provider on file.       Hospital Day: 1    Assessment and Plan:   Margy Chong is a 23 y.o. female with a pmh of migraine and hypertension who presents with Intractable headache, unspecified chronicity pattern, unspecified headache type    Hospital Problems             Last Modified POA    * (Principal) Intractable headache, unspecified chronicity pattern, unspecified headache type 2024 Yes    Uncontrolled hypertension 2024 Yes   Chest pain    Plan:  Check EKG, troponin and BNP, fasting lipids and glycosylated hemoglobin, chest x-ray  Telemetry  Check 2D echo  Add atenolol, aspirin  As needed hydralazine  As needed Toradol and Ultram  Continue home regimen chronic stable conditions    Disposition:   Current Living situation: Home  Expected Disposition: Home  Estimated D/C: 2 days    Diet ADULT DIET; Regular; No Added Salt (3-4 gm)   DVT Prophylaxis [] Lovenox, []  Heparin, [] SCDs, [x] Ambulation,  [] Eliquis, [] Xarelto, [] Coumadin   Code Status Full Code   Surrogate Decision Maker/ POA Mother     Personally reviewed Lab Studies and Imaging     Discussed management of the case with ED provider who recommended admission    EKG interpreted personally and results none done yet.    Imaging that was interpreted personally includes none and results n/a.    Drugs that require monitoring for toxicity include none and the method of monitoring was n/a.        History from:     patient    History of Present Illness:     Chief Complaint: Headache  Margy Chong is a 23 y.o. female with pmh of migraines, bipolar disorder and questionable hypertension who presents with 3 weeks of left frontal headache with associated noise and light sensitivity but no nausea or vomiting.  She denies any visual  sodium chloride flush  5-40 mL IntraVENous 2 times per day    [START ON 2/9/2024] ARIPiprazole  30 mg Oral Daily    [START ON 2/9/2024] atomoxetine  40 mg Oral Daily    [START ON 2/9/2024] buPROPion  300 mg Oral Daily    cloNIDine  0.6 mg Oral Nightly    dextroamphetamine  10 mg Oral BID    risperiDONE  2 mg Oral See Admin Instructions    topiramate  50 mg Oral BID    [START ON 2/9/2024] atenolol  25 mg Oral Daily      Infusions:    sodium chloride      lactated ringers IV soln       PRN Meds: prochlorperazine, 10 mg, Q6H PRN  butalbital-acetaminophen-caffeine, 1 tablet, Q4H PRN  sodium chloride flush, 5-40 mL, PRN  sodium chloride, , PRN  potassium chloride, 40 mEq, PRN   Or  potassium alternative oral replacement, 40 mEq, PRN   Or  potassium chloride, 10 mEq, PRN  magnesium sulfate, 2,000 mg, PRN  ondansetron, 4 mg, Q8H PRN   Or  ondansetron, 4 mg, Q6H PRN  polyethylene glycol, 17 g, Daily PRN  acetaminophen, 650 mg, Q6H PRN   Or  acetaminophen, 650 mg, Q6H PRN  aluminum & magnesium hydroxide-simethicone, 30 mL, Q6H PRN  ketorolac, 30 mg, Q6H PRN  traMADol, 50 mg, Q6H PRN   Or  traMADol, 100 mg, Q6H PRN  hydrOXYzine HCl, 25 mg, Q4H PRN  hydrALAZINE, 10 mg, Q4H PRN  LORazepam, 1 mg, Nightly PRN        Labs      CBC:   Recent Labs     02/08/24  1727   WBC 14.9*   HGB 14.8        BMP:    Recent Labs     02/08/24  1727      K 4.0      CO2 23   BUN 11   CREATININE 1.2*   GLUCOSE 118*     Hepatic: No results for input(s): \"AST\", \"ALT\", \"ALB\", \"BILITOT\", \"ALKPHOS\" in the last 72 hours.  Lipids: No results found for: \"CHOL\", \"HDL\", \"TRIG\"  Hemoglobin A1C: No results found for: \"LABA1C\"  TSH: No results found for: \"TSH\"  Troponin: No results found for: \"TROPONINT\"  Lactic Acid: No results for input(s): \"LACTA\" in the last 72 hours.  BNP: No results for input(s): \"PROBNP\" in the last 72 hours.  UA:  Lab Results   Component Value Date/Time    NITRU Negative 02/08/2024 05:28 PM    COLORU Yellow 02/08/2024

## 2024-02-09 NOTE — PROGRESS NOTES
Patient admitted to room 3118 from ED. Pt AO x 4, RA. Oriented to room, call light, and floor policies.  Plan of care reviewed with patient. Pt is resting in bed and c/o headache with 6/10 pain at this time; no s/s of distress noted. Assessment completed; VSS. Pt rates a low fall risk; bed alarm deferred. Safety precautions in place; call light and bedside table within reach. Pt encouraged to call for needs.   Electronically signed by Charly Pettit RN on 2/9/2024 at 1:09 AM

## 2024-02-12 NOTE — DISCHARGE SUMMARY
tolerated:    Follow Up:  Follow-up with PCP in 1-2 weeks                        Labs: For convenience and continuity at follow-up the following most recent labs are provided:      CBC:   Lab Results   Component Value Date/Time    WBC 12.9 02/09/2024 05:16 AM    HGB 11.9 02/09/2024 05:16 AM    HCT 36.6 02/09/2024 05:16 AM     02/09/2024 05:16 AM       RENAL:   Lab Results   Component Value Date/Time     02/09/2024 05:16 AM    K 3.7 02/09/2024 05:16 AM     02/09/2024 05:16 AM    CO2 22 02/09/2024 05:16 AM    BUN 11 02/09/2024 05:16 AM    CREATININE 1.2 02/09/2024 05:16 AM           Discharge Medications:      Medication List        START taking these medications      atenolol 50 MG tablet  Commonly known as: TENORMIN  Take 1 tablet by mouth daily     butalbital-acetaminophen-caffeine-codeine -01-30 MG per capsule  Commonly known as: FIORICET WITH CODEINE  Take 1 capsule by mouth every 4 hours as needed (headache) for up to 10 days. Max Daily Amount: 6 capsules            CONTINUE taking these medications      ARIPiprazole 30 MG tablet  Commonly known as: ABILIFY     atomoxetine 60 MG capsule  Commonly known as: STRATTERA     buPROPion 150 MG extended release tablet  Commonly known as: WELLBUTRIN XL     cloNIDine 0.3 MG tablet  Commonly known as: CATAPRES     dextroamphetamine 10 MG tablet  Commonly known as: DEXTROSTAT     LORazepam 0.5 MG tablet  Commonly known as: ATIVAN     Mirena (52 MG) IUD 52 mg  Generic drug: levonorgestrel     * risperiDONE 2 MG tablet  Commonly known as: RISPERDAL     * risperiDONE 1 MG tablet  Commonly known as: RISPERDAL           * This list has 2 medication(s) that are the same as other medications prescribed for you. Read the directions carefully, and ask your doctor or other care provider to review them with you.                   Where to Get Your Medications        You can get these medications from any pharmacy    Bring a paper prescription for each of these

## 2024-06-03 ENCOUNTER — HOSPITAL ENCOUNTER (EMERGENCY)
Age: 24
Discharge: LEFT BEFORE DISCHARGE | End: 2024-06-03
Attending: EMERGENCY MEDICINE

## 2024-06-03 ENCOUNTER — APPOINTMENT (OUTPATIENT)
Dept: GENERAL RADIOLOGY | Age: 24
End: 2024-06-03

## 2024-06-03 VITALS
WEIGHT: 224.87 LBS | OXYGEN SATURATION: 100 % | DIASTOLIC BLOOD PRESSURE: 80 MMHG | TEMPERATURE: 99.2 F | BODY MASS INDEX: 38.39 KG/M2 | HEIGHT: 64 IN | HEART RATE: 86 BPM | RESPIRATION RATE: 20 BRPM | SYSTOLIC BLOOD PRESSURE: 123 MMHG

## 2024-06-03 DIAGNOSIS — R06.02 SHORTNESS OF BREATH: Primary | ICD-10-CM

## 2024-06-03 ASSESSMENT — PAIN SCALES - GENERAL: PAINLEVEL_OUTOF10: 5

## 2024-06-03 ASSESSMENT — PAIN DESCRIPTION - DESCRIPTORS: DESCRIPTORS: ACHING

## 2024-06-03 ASSESSMENT — PAIN - FUNCTIONAL ASSESSMENT: PAIN_FUNCTIONAL_ASSESSMENT: 0-10

## 2024-06-03 ASSESSMENT — PAIN DESCRIPTION - PAIN TYPE: TYPE: ACUTE PAIN

## 2024-06-03 ASSESSMENT — PAIN DESCRIPTION - FREQUENCY: FREQUENCY: CONTINUOUS

## 2024-06-03 ASSESSMENT — PAIN DESCRIPTION - LOCATION: LOCATION: HEAD

## 2024-06-03 NOTE — ED NOTES
After nurse triage pt states she wants to leave and not continue with treatment and declined to see a provider. Pt ambulated to the lobby independently and left. VS stable. Respirations even, easy, unlabored on room air. A&Ox4, calm and cooperative.

## 2024-06-04 NOTE — ED PROVIDER NOTES
Patient left from the ED without notifying ED staff.  They left without notifying ED staff.  Therefore, we could not give them warnings about what might happen if they left without being seen.    1. Shortness of breath           Jim Pickett,   06/03/24 0624

## 2025-01-13 NOTE — PLAN OF CARE
Problem: Discharge Planning  Goal: Discharge to home or other facility with appropriate resources  7/31/2023 2347 by Nils Walden RN  Outcome: Progressing  7/31/2023 1054 by Miladys Busch RN  Outcome: Progressing     Problem: Safety - Adult  Goal: Free from fall injury  7/31/2023 2347 by Nils Walden RN  Outcome: Progressing  7/31/2023 1054 by Miladys Busch RN  Outcome: Progressing     Problem: ABCDS Injury Assessment  Goal: Absence of physical injury  7/31/2023 2347 by Nils Walden RN  Outcome: Progressing  7/31/2023 1054 by Miladys Busch RN  Outcome: Progressing     Problem: Neurosensory - Adult  Goal: Achieves stable or improved neurological status  7/31/2023 2347 by Nils Walden RN  Outcome: Progressing  7/31/2023 1054 by Miladys Busch RN  Outcome: Progressing  Goal: Achieves maximal functionality and self care  7/31/2023 2347 by Nils Walden RN  Outcome: Progressing  7/31/2023 1054 by Miladys Busch RN  Outcome: Progressing     Problem: Musculoskeletal - Adult  Goal: Return mobility to safest level of function  7/31/2023 2347 by Nils Walden RN  Outcome: Progressing  7/31/2023 1054 by Miladys Busch RN  Outcome: Progressing  Goal: Maintain proper alignment of affected body part  7/31/2023 2347 by Nils Walden RN  Outcome: Progressing  7/31/2023 1054 by Miladys Busch RN  Outcome: Progressing  Goal: Return ADL status to a safe level of function  7/31/2023 2347 by Nils Walden RN  Outcome: Progressing  7/31/2023 1054 by Miladys Busch RN  Outcome: Progressing     Problem: Pain  Goal: Verbalizes/displays adequate comfort level or baseline comfort level  Outcome: Progressing 5

## 2025-06-21 ENCOUNTER — HOSPITAL ENCOUNTER (EMERGENCY)
Age: 25
Discharge: HOME | End: 2025-06-21
Payer: MEDICAID

## 2025-06-21 VITALS
SYSTOLIC BLOOD PRESSURE: 120 MMHG | DIASTOLIC BLOOD PRESSURE: 69 MMHG | HEART RATE: 73 BPM | TEMPERATURE: 97.88 F | OXYGEN SATURATION: 98 %

## 2025-06-21 VITALS — BODY MASS INDEX: 32.4 KG/M2
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
## 2025-06-21 DIAGNOSIS — S93.401A: Primary | ICD-10-CM
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
## 2025-06-21 PROCEDURE — 73610 X-RAY EXAM OF ANKLE: CPT

## 2025-06-21 PROCEDURE — 99283 EMERGENCY DEPT VISIT LOW MDM: CPT

## 2025-06-21 PROCEDURE — 73630 X-RAY EXAM OF FOOT: CPT

## 2025-06-23 ENCOUNTER — HOSPITAL ENCOUNTER (EMERGENCY)
Age: 25
Discharge: HOME | End: 2025-06-23
Payer: MEDICAID

## 2025-06-23 VITALS — HEART RATE: 54 BPM

## 2025-06-23 VITALS — SYSTOLIC BLOOD PRESSURE: 124 MMHG | DIASTOLIC BLOOD PRESSURE: 88 MMHG | HEART RATE: 60 BPM | OXYGEN SATURATION: 99 %

## 2025-06-23 VITALS
TEMPERATURE: 98 F | HEART RATE: 59 BPM | DIASTOLIC BLOOD PRESSURE: 87 MMHG | OXYGEN SATURATION: 99 % | SYSTOLIC BLOOD PRESSURE: 145 MMHG

## 2025-06-23 VITALS — HEART RATE: 58 BPM | DIASTOLIC BLOOD PRESSURE: 90 MMHG | SYSTOLIC BLOOD PRESSURE: 124 MMHG

## 2025-06-23 VITALS — HEART RATE: 67 BPM | OXYGEN SATURATION: 99 %

## 2025-06-23 VITALS — BODY MASS INDEX: 32.4 KG/M2

## 2025-06-23 VITALS — HEART RATE: 67 BPM

## 2025-06-23 VITALS — HEART RATE: 63 BPM

## 2025-06-23 VITALS — HEART RATE: 61 BPM

## 2025-06-23 VITALS — HEART RATE: 64 BPM

## 2025-06-23 VITALS — HEART RATE: 65 BPM

## 2025-06-23 VITALS — HEART RATE: 74 BPM

## 2025-06-23 VITALS — HEART RATE: 62 BPM

## 2025-06-23 DIAGNOSIS — F12.10: ICD-10-CM

## 2025-06-23 DIAGNOSIS — F14.10: ICD-10-CM

## 2025-06-23 DIAGNOSIS — F17.210: ICD-10-CM

## 2025-06-23 DIAGNOSIS — F10.11: ICD-10-CM

## 2025-06-23 DIAGNOSIS — R07.9: Primary | ICD-10-CM

## 2025-06-23 LAB
ADD MANUAL DIFF: NO
ALANINE AMINOTRANSFERASE: 23 U/L (ref 14–59)
ALBUMIN GLOBULIN RATIO: 1
ALBUMIN LEVEL: 3.8 G/DL (ref 3.4–5)
ALKALINE PHOSPHATASE: 174 U/L (ref 46–116)
AMORPH SED URNS QL MICRO: (no result)
ANION GAP: 12.6
ASPARTATE AMINO TRANSFERASE: 14 U/L (ref 15–37)
BACTERIA URINE: (no result) #/HPF
BASOPHILS # BLD AUTO: 0.1 10^3/UL (ref 0–0.1)
BASOPHILS NFR BLD AUTO: 0.5 % (ref 0.2–2)
BILIRUBIN TOTAL: 0.2 MG/DL (ref 0.2–1)
BILIRUBIN URINE: NEGATIVE
BUN SERPL-MCNC: 12 MG/DL (ref 7–18)
BUN/CREAT SERPL: 9.4
CALCIUM: 8.8 MG/DL (ref 8.5–10.1)
CAST SEEN?: (no result) #/LPF
CHLORIDE: 109 MMOL/L (ref 98–107)
CLARITY UR: CLEAR
CO2 BLD-SCNC: 28.1 MMOL/L (ref 21–32)
COARSE GRAN CASTS URNS QL MICRO: (no result)
COLOR UR: (no result)
CREAT SERPL-SCNC: 1.27 MG/DL (ref 0.55–1.02)
EOSINOPHIL # BLD AUTO: 0.4 10^3/UL (ref 0–0.7)
EOSINOPHIL NFR BLD AUTO: 2.6 % (ref 0.9–7)
ERYTHROCYTE [DISTWIDTH] IN BLOOD BY AUTOMATED COUNT: 12.4 % (ref 11–15)
ESTIMATED GFR (AFRICAN AMERICA: >60
ESTIMATED GFR (NON-AFRICAN AME: 51
GLOBULIN: 3.7 G/DL
GLUCOSE SERPLBLD-MCNC: 85 MG/DL (ref 74–106)
GLUCOSE URINE UA: NEGATIVE MG/DL
HCG QUALITATIVE URINE*: NEGATIVE
HCT VFR BLD CALC: 41.1 % (ref 36–48)
HEMOGLOBIN: 13.1 G/DL (ref 12–16)
HGB UR QL: NEGATIVE
IMMATURE GRANULOCYTES ABS AUTO: 0.09 10^3/UL (ref 0–0.03)
IMMATURE GRANULOCYTES PCT AUTO: 0.7 % (ref 0–0.5)
INTERNAL CONTROL: (no result)
KETONES URINE: NEGATIVE MG/DL
LEUKOCYTE ESTERASE UR QL: NEGATIVE
LYMPHOCYTES  ABSOLUTE AUTO: 3.2 10^3/UL (ref 1.2–3.8)
LYMPHOCYTES PERCENT AUTO: 23.6 % (ref 20.5–60)
Lab: (no result) #/HPF
MCH RBC QN AUTO: 28.2 PG (ref 26.7–34)
MCV RBC: 88.4 FL (ref 81–99)
MEAN CORPUSCULAR HGB CONC: 31.9 G/DL (ref 29.9–35.2)
MEAN PLATELET VOLUME: 9.5 FL (ref 9.5–13.5)
MONOCYTES # BLD AUTO: 0.6 10^3/UL (ref 0.3–0.8)
MONOCYTES NFR BLD AUTO: 4.6 % (ref 1.7–12)
MUCUS URINE: (no result)
NEUTROPHILS # BLD AUTO: 9.1 10^3/UL (ref 1.4–6.5)
NEUTROPHILS NFR BLD AUTO: 68 % (ref 43–75)
NITRITE URINE: NEGATIVE
PH UR: 6.5 [PH] (ref 5–9)
PLATELET # BLD: 352 10^3/UL (ref 150–450)
POTASSIUM SERPLBLD-SCNC: 3.7 MMOL/L (ref 3.5–5.1)
PROTEIN URINE: NEGATIVE MG/DL
RBC # BLD AUTO: 4.65 10^6/UL (ref 4.2–5.4)
RBC URINE: (no result) #/HPF (ref 0–2)
SODIUM BLD-SCNC: 146 MMOL/L (ref 136–145)
SPECIFIC GRAVITY URINE: 1.01 (ref 1–1.02)
SPERM # UR AUTO: (no result) /UL
SQUAMOUS EPITHELIAL CELL URINE: (no result) #/LPF
TOTAL PROTEIN: 7.5 G/DL (ref 6.4–8.2)
URINE CULTURE INDICATED: NO
UROBILINOGEN UR QL: 1 EU/DL (ref 0.2–1)
WBC # BLD: 13.4 10^3/UL (ref 4–11)
WBC URINE: (no result) #/HPF

## 2025-06-23 PROCEDURE — 93005 ELECTROCARDIOGRAM TRACING: CPT

## 2025-06-23 PROCEDURE — 80053 COMPREHEN METABOLIC PANEL: CPT

## 2025-06-23 PROCEDURE — 71045 X-RAY EXAM CHEST 1 VIEW: CPT

## 2025-06-23 PROCEDURE — 99285 EMERGENCY DEPT VISIT HI MDM: CPT

## 2025-06-23 PROCEDURE — 36415 COLL VENOUS BLD VENIPUNCTURE: CPT

## 2025-06-23 PROCEDURE — 84703 CHORIONIC GONADOTROPIN ASSAY: CPT

## 2025-06-23 PROCEDURE — 81001 URINALYSIS AUTO W/SCOPE: CPT

## 2025-06-23 PROCEDURE — 85025 COMPLETE CBC W/AUTO DIFF WBC: CPT
